# Patient Record
Sex: FEMALE | Race: WHITE | NOT HISPANIC OR LATINO | URBAN - METROPOLITAN AREA
[De-identification: names, ages, dates, MRNs, and addresses within clinical notes are randomized per-mention and may not be internally consistent; named-entity substitution may affect disease eponyms.]

---

## 2020-07-25 ENCOUNTER — TELEPHONE ENCOUNTER (OUTPATIENT)
Dept: URBAN - METROPOLITAN AREA CLINIC 13 | Facility: CLINIC | Age: 55
End: 2020-07-25

## 2020-07-25 RX ORDER — POLYETHYLENE GLYCOL 3350, SODIUM CHLORIDE, SODIUM BICARBONATE AND POTASSIUM CHLORIDE WITH LEMON FLAVOR 420; 11.2; 5.72; 1.48 G/4L; G/4L; G/4L; G/4L
TAKE 1/2 GALLON AT 5:00 PM DAY BEFORE PROCEDURE, TAKE SECOND 1/2 OF GALLON 6 HRS PRIOR TO PROCEDURE POWDER, FOR SOLUTION ORAL
Qty: 1 | Refills: 0 | OUTPATIENT
Start: 2018-12-11 | End: 2018-12-18

## 2020-07-26 ENCOUNTER — TELEPHONE ENCOUNTER (OUTPATIENT)
Dept: URBAN - METROPOLITAN AREA CLINIC 13 | Facility: CLINIC | Age: 55
End: 2020-07-26

## 2020-07-26 RX ORDER — TRAZODONE HYDROCHLORIDE 100 MG/1
TABLET, FILM COATED ORAL
Qty: 45 | Refills: 0 | Status: ACTIVE | COMMUNITY
Start: 2018-04-05

## 2020-07-26 RX ORDER — DOXYCYCLINE HYCLATE 100 MG/1
TABLET ORAL
Qty: 20 | Refills: 0 | Status: ACTIVE | COMMUNITY
Start: 2018-05-12

## 2020-07-26 RX ORDER — OXYCODONE AND ACETAMINOPHEN 5; 325 MG/1; MG/1
TAKE 1 OR 2 TABLETS BY MOUTH EVERY 4 HOURS AS NEEDED TABLET ORAL
Qty: 40 | Refills: 0 | Status: ACTIVE | COMMUNITY
Start: 2018-06-02

## 2020-07-26 RX ORDER — VENLAFAXINE HYDROCHLORIDE 75 MG/1
TAKE ONE CAPSULE BY MOUTH EVERY DAY AT SAME TIME AS 150MG CAPSULE, EXTENDED RELEASE ORAL
Qty: 30 | Refills: 0 | Status: ACTIVE | COMMUNITY
Start: 2018-11-12

## 2020-07-26 RX ORDER — HYDROXYZINE HYDROCHLORIDE 25 MG/1
TABLET, FILM COATED ORAL
Qty: 30 | Refills: 0 | Status: ACTIVE | COMMUNITY
Start: 2019-01-29

## 2020-07-26 RX ORDER — OXYCODONE AND ACETAMINOPHEN 7.5; 325 MG/1; MG/1
TABLET ORAL
Qty: 20 | Refills: 0 | Status: ACTIVE | COMMUNITY
Start: 2018-05-17

## 2020-07-26 RX ORDER — SUMATRIPTAN SUCCINATE 50 MG/1
TAKE 1 TABLET AT ONSET OF MIGRAINE HEADACHE.  MAY REPEAT IN 2 HOURS IF NEEDED TABLET, FILM COATED ORAL
Refills: 0 | Status: ACTIVE | COMMUNITY
Start: 2018-09-06

## 2020-07-26 RX ORDER — PROMETHAZINE HYDROCHLORIDE AND DEXTROMETHORPHAN HYDROBROMIDE 6.25; 15 MG/5ML; MG/5ML
TAKE 5 ML (ORAL) EVERY 6 HOURS FOR 4 DAYS SOLUTION ORAL
Qty: 80 | Refills: 0 | Status: ACTIVE | COMMUNITY
Start: 2018-04-05

## 2020-07-26 RX ORDER — ALPRAZOLAM 1 MG/1
TABLET ORAL
Qty: 90 | Refills: 0 | Status: ACTIVE | COMMUNITY
Start: 2018-03-15

## 2020-07-26 RX ORDER — HYDROXYZINE HYDROCHLORIDE 25 MG/1
TAKE 1 TABLET BY MOUTH AS NEEDED 1 HOUR PRIOR TO BEDTIME TABLET, FILM COATED ORAL
Qty: 30 | Refills: 0 | Status: ACTIVE | COMMUNITY
Start: 2019-01-02

## 2020-07-26 RX ORDER — LEVOFLOXACIN 750 MG/1
TAKE 1 TABLET BY MOUTH EVERY DAY FOR 10 DAYS TABLET, FILM COATED ORAL
Qty: 10 | Refills: 0 | Status: ACTIVE | COMMUNITY
Start: 2019-01-02

## 2020-07-26 RX ORDER — FLUOXETINE HYDROCHLORIDE 40 MG/1
CAPSULE ORAL
Qty: 30 | Refills: 0 | Status: ACTIVE | COMMUNITY
Start: 2018-06-28

## 2020-07-26 RX ORDER — HYDROXYZINE HYDROCHLORIDE 50 MG/1
TABLET, FILM COATED ORAL
Qty: 90 | Refills: 0 | Status: ACTIVE | COMMUNITY
Start: 2018-05-03

## 2020-07-26 RX ORDER — BUSPIRONE HYDROCHLORIDE 10 MG/1
TAKE 1 TABLET BY MOUTH TWICE A DAY TABLET ORAL
Qty: 60 | Refills: 0 | Status: ACTIVE | COMMUNITY
Start: 2018-08-01

## 2020-07-26 RX ORDER — FLUOXETINE HYDROCHLORIDE 40 MG/1
CAPSULE ORAL
Qty: 30 | Refills: 0 | Status: ACTIVE | COMMUNITY
Start: 2018-03-25

## 2020-07-26 RX ORDER — PRAZOSIN HYDROCHLORIDE 1 MG/1
CAPSULE ORAL
Qty: 60 | Refills: 0 | Status: ACTIVE | COMMUNITY
Start: 2018-03-10

## 2020-07-26 RX ORDER — INDOMETHACIN 50 MG/1
TAKE 1 CAPSULE BY MOUTH TWICE A DAY WITH FOOD OR MILK CAPSULE ORAL
Qty: 60 | Refills: 0 | Status: ACTIVE | COMMUNITY
Start: 2019-03-29

## 2020-07-26 RX ORDER — PROMETHAZINE HYDROCHLORIDE 25 MG/1
TAKE 1 TABLET EVERY 6 HOURS AS NEEDED FOR NAUSEA TABLET ORAL
Refills: 0 | Status: ACTIVE | COMMUNITY
Start: 2018-10-12

## 2020-07-26 RX ORDER — TIZANIDINE 4 MG/1
TABLET ORAL
Qty: 90 | Refills: 0 | Status: ACTIVE | COMMUNITY
Start: 2018-04-05

## 2020-07-26 RX ORDER — PROMETHAZINE HYDROCHLORIDE AND DEXTROMETHORPHAN HYDROBROMIDE 6.25; 15 MG/5ML; MG/5ML
TAKE 5 ML BY MOUTH 4 TIMES DAILY AS NEEDED FOR COUGH FOR 6 DAYS. MAY C SOLUTION ORAL
Qty: 120 | Refills: 0 | Status: ACTIVE | COMMUNITY
Start: 2018-05-12

## 2020-07-26 RX ORDER — CLONIDINE HYDROCHLORIDE 0.1 MG/1
TAKE 1 TABLET BY MOUTH AT BEDTIME TABLET ORAL
Qty: 30 | Refills: 0 | Status: ACTIVE | COMMUNITY
Start: 2018-08-01

## 2020-07-26 RX ORDER — ERENUMAB-AOOE 70 MG/ML
INJECT MG ONCE INJECTION SUBCUTANEOUS
Refills: 0 | Status: ACTIVE | COMMUNITY
Start: 2018-10-12

## 2020-07-26 RX ORDER — SULFAMETHOXAZOLE AND TRIMETHOPRIM 800; 160 MG/1; MG/1
TABLET ORAL
Qty: 20 | Refills: 0 | Status: ACTIVE | COMMUNITY
Start: 2018-04-05

## 2020-07-26 RX ORDER — PROMETHAZINE HYDROCHLORIDE 12.5 MG/1
TAKE 1 TABLET BY MOUTH 3 TIMES A DAY AS NEEDED TABLET ORAL
Qty: 90 | Refills: 0 | Status: ACTIVE | COMMUNITY
Start: 2018-11-12

## 2020-07-26 RX ORDER — METHYLPREDNISOLONE 4 MG/1
TAKE 6 TABLETS ON DAY 1 AS DIRECTED ON PACKAGE AND DECREASE BY 1 TAB E TABLET ORAL
Qty: 21 | Refills: 0 | Status: ACTIVE | COMMUNITY
Start: 2019-03-22

## 2020-07-26 RX ORDER — PROMETHAZINE HYDROCHLORIDE AND DEXTROMETHORPHAN HYDROBROMIDE 6.25; 15 MG/5ML; MG/5ML
TAKE 5MLS BY MOUTH EVERY 6 HOURS AS NEEDED FOR COUGHING FOR 6 DAYS SOLUTION ORAL
Qty: 120 | Refills: 0 | Status: ACTIVE | COMMUNITY
Start: 2019-04-30

## 2020-07-26 RX ORDER — SULFAMETHOXAZOLE AND TRIMETHOPRIM 800; 160 MG/1; MG/1
TABLET ORAL
Qty: 28 | Refills: 0 | Status: ACTIVE | COMMUNITY
Start: 2018-11-12

## 2020-07-26 RX ORDER — ZOLMITRIPTAN 5 MG/1
TAKE 1 TABLET AT ONSET OF MIGRAINE. MAY REPEAT ONCE AFTER 2 HOURS. MAX 10 MG/DAY TABLET, FILM COATED ORAL
Refills: 0 | Status: ACTIVE | COMMUNITY
Start: 2018-08-08

## 2020-07-26 RX ORDER — AZELASTINE HCL 205.5 UG/1
INSTILL 1 SPRAY IN EACH NOSTRIL TWICE DAILY AS NEEDED FOR CONGESTION SPRAY NASAL
Qty: 30 | Refills: 0 | Status: ACTIVE | COMMUNITY
Start: 2018-04-05

## 2020-07-26 RX ORDER — METHYLPREDNISOLONE 4 MG/1
TAKE 6 TABLETS ON DAY 1 AS DIRECTED ON PACKAGE AND DECREASE BY 1 TAB E TABLET ORAL
Qty: 21 | Refills: 0 | Status: ACTIVE | COMMUNITY
Start: 2018-04-05

## 2020-07-26 RX ORDER — PROPRANOLOL HYDROCHLORIDE 80 MG/1
TAKE 1 CAPSULE DAILY CAPSULE, EXTENDED RELEASE ORAL
Refills: 0 | Status: ACTIVE | COMMUNITY
Start: 2018-08-15

## 2020-07-26 RX ORDER — TIZANIDINE 4 MG/1
TAKE 1 TABLET BY MOUTH 3 TIMES A DAY AS NEEDED TABLET ORAL
Qty: 90 | Refills: 0 | Status: ACTIVE | COMMUNITY
Start: 2017-10-31

## 2020-07-26 RX ORDER — ESTRADIOL 0.1 MG/D
APPLY 1 PATCH TWICE WEEKLY AS DIRECTED FILM, EXTENDED RELEASE TRANSDERMAL
Refills: 0 | Status: ACTIVE | COMMUNITY
Start: 2018-08-20

## 2020-07-26 RX ORDER — TRAZODONE HYDROCHLORIDE 150 MG/1
TAKE 1 TABLET ONCE DAILY TABLET ORAL
Refills: 0 | Status: ACTIVE | COMMUNITY
Start: 2018-08-28

## 2020-07-26 RX ORDER — ALBUTEROL SULFATE 90 UG/1
INHALE 1 TO 2 PUFFS EVERY 4 TO 6 HOURS AS NEEDED AEROSOL, METERED RESPIRATORY (INHALATION)
Refills: 0 | Status: ACTIVE | COMMUNITY
Start: 2019-01-02

## 2020-07-26 RX ORDER — TRAZODONE HYDROCHLORIDE 100 MG/1
TAKE 1 TO 1 AND 1/2 TABLET BY MOUTH AT BEDTIME AS NEEDED FOR SLEEP TABLET, FILM COATED ORAL
Qty: 45 | Refills: 0 | Status: ACTIVE | COMMUNITY
Start: 2018-02-19

## 2020-07-26 RX ORDER — ALBUTEROL SULFATE 90 UG/1
INHALE 1-2 PUFFS INTO THE LUNGS 4 TIMES DAILY AS NEEDED AEROSOL, METERED RESPIRATORY (INHALATION)
Qty: 9 | Refills: 0 | Status: ACTIVE | COMMUNITY
Start: 2018-05-12

## 2020-07-26 RX ORDER — TOPIRAMATE 100 MG/1
TABLET, FILM COATED ORAL
Qty: 180 | Refills: 0 | Status: ACTIVE | COMMUNITY
Start: 2018-09-13

## 2020-07-26 RX ORDER — GALCANEZUMAB 120 MG/ML
INJECT 2 PENS SUBCUTANEOUSLY AS DIRECTED FOR LOADING DOSE INJECTION, SOLUTION SUBCUTANEOUS
Qty: 2 | Refills: 0 | Status: ACTIVE | COMMUNITY
Start: 2019-02-21

## 2020-07-26 RX ORDER — HYDROCODONE BITARTRATE AND ACETAMINOPHEN 5; 325 MG/1; MG/1
TAKE 1 TABLET BY MOUTH EVERY 6 HOURS TABLET ORAL
Qty: 40 | Refills: 0 | Status: ACTIVE | COMMUNITY
Start: 2018-06-14

## 2020-07-26 RX ORDER — FLUOXETINE HYDROCHLORIDE 40 MG/1
TAKE ONE CAPSULE BY MOUTH EVERY DAY CAPSULE ORAL
Qty: 30 | Refills: 0 | Status: ACTIVE | COMMUNITY
Start: 2018-05-01

## 2020-07-26 RX ORDER — LIOTHYRONINE SODIUM 5 UG/1
TAKE 1 TABLET DAILY TABLET ORAL
Refills: 0 | Status: ACTIVE | COMMUNITY
Start: 2018-12-04

## 2020-07-26 RX ORDER — LEVOTHYROXINE SODIUM 0.07 MG/1
TAKE 1 TABLET DAILY TABLET ORAL
Refills: 0 | Status: ACTIVE | COMMUNITY
Start: 2018-08-15

## 2020-07-26 RX ORDER — ESCITALOPRAM 20 MG/1
TAKE 1 TABLET BY MOUTH EVERY MORNING TABLET, FILM COATED ORAL
Qty: 30 | Refills: 0 | Status: ACTIVE | COMMUNITY
Start: 2018-10-22

## 2020-07-26 RX ORDER — HYDROXYZINE HYDROCHLORIDE 50 MG/1
TAKE 1 TABLET BY MOUTH 3 TIMES A DAY AS NEEDED FOR ANXIETY TABLET, FILM COATED ORAL
Qty: 90 | Refills: 0 | Status: ACTIVE | COMMUNITY
Start: 2018-05-01

## 2020-07-26 RX ORDER — DOXYCYCLINE HYCLATE 100 MG/1
TAKE 1 TABLET BY MOUTH TWICE A DAY FOR 10 DAYS TABLET ORAL
Qty: 20 | Refills: 0 | Status: ACTIVE | COMMUNITY
Start: 2019-04-30

## 2020-07-26 RX ORDER — FLUOXETINE 20 MG/1
TAKE 3 CAPSULES BY MOUTH EVERY DAY CAPSULE ORAL
Qty: 90 | Refills: 0 | Status: ACTIVE | COMMUNITY
Start: 2018-08-01

## 2020-07-26 RX ORDER — SULFAMETHOXAZOLE AND TRIMETHOPRIM 800; 160 MG/1; MG/1
TAKE 1 TABLET BY MOUTH TWICE A DAY TABLET ORAL
Qty: 28 | Refills: 0 | Status: ACTIVE | COMMUNITY
Start: 2018-10-22

## 2020-07-26 RX ORDER — PRAZOSIN HYDROCHLORIDE 1 MG/1
TAKE 1 TO 2 CAPSULES BY MOUTH AT BEDTIME CAPSULE ORAL
Qty: 60 | Refills: 0 | Status: ACTIVE | COMMUNITY
Start: 2017-11-27

## 2020-07-26 RX ORDER — FLUOXETINE HYDROCHLORIDE 40 MG/1
TAKE ONE CAPSULE BY MOUTH EVERY DAY CAPSULE ORAL
Qty: 30 | Refills: 0 | Status: ACTIVE | COMMUNITY
Start: 2018-02-19

## 2020-07-26 RX ORDER — VENLAFAXINE HYDROCHLORIDE 150 MG/1
CAPSULE, EXTENDED RELEASE ORAL
Qty: 30 | Refills: 0 | Status: ACTIVE | COMMUNITY
Start: 2018-11-12

## 2020-07-26 RX ORDER — ATORVASTATIN CALCIUM 80 MG/1
TAKE 1 TABLET DAILY TABLET, FILM COATED ORAL
Refills: 0 | Status: ACTIVE | COMMUNITY
Start: 2018-10-21

## 2020-07-26 RX ORDER — ALPRAZOLAM 1 MG/1
TAKE 1 TABLET  PRN TABLET ORAL
Refills: 0 | Status: ACTIVE | COMMUNITY
Start: 2018-02-19

## 2020-07-26 RX ORDER — CODEINE PHOSPHATE AND GUAIFENESIN 10; 100 MG/5ML; MG/5ML
TAKE 5 ML BY MOUTH EVERY 4 HOURS FOR 10 DAYS SOLUTION ORAL
Qty: 300 | Refills: 0 | Status: ACTIVE | COMMUNITY
Start: 2018-11-12

## 2023-03-21 ENCOUNTER — WEB ENCOUNTER (OUTPATIENT)
Dept: URBAN - METROPOLITAN AREA CLINIC 113 | Facility: CLINIC | Age: 58
End: 2023-03-21

## 2023-03-24 ENCOUNTER — OFFICE VISIT (OUTPATIENT)
Dept: URBAN - METROPOLITAN AREA CLINIC 113 | Facility: CLINIC | Age: 58
End: 2023-03-24
Payer: COMMERCIAL

## 2023-03-24 ENCOUNTER — LAB OUTSIDE AN ENCOUNTER (OUTPATIENT)
Dept: URBAN - METROPOLITAN AREA CLINIC 113 | Facility: CLINIC | Age: 58
End: 2023-03-24

## 2023-03-24 DIAGNOSIS — E61.1 IRON DEFICIENCY: ICD-10-CM

## 2023-03-24 DIAGNOSIS — R19.4 CHANGE IN BOWEL HABITS: ICD-10-CM

## 2023-03-24 PROCEDURE — 99204 OFFICE O/P NEW MOD 45 MIN: CPT | Performed by: INTERNAL MEDICINE

## 2023-03-24 RX ORDER — BUSPIRONE HYDROCHLORIDE 10 MG/1
TAKE 1 TABLET BY MOUTH TWICE A DAY TABLET ORAL
Qty: 60 | Refills: 0 | Status: ON HOLD | COMMUNITY
Start: 2018-08-01

## 2023-03-24 RX ORDER — SULFAMETHOXAZOLE AND TRIMETHOPRIM 800; 160 MG/1; MG/1
TABLET ORAL
Qty: 20 | Refills: 0 | Status: ON HOLD | COMMUNITY
Start: 2018-04-05

## 2023-03-24 RX ORDER — LEVOTHYROXINE SODIUM 0.07 MG/1
TAKE 1 TABLET DAILY TABLET ORAL
Refills: 0 | Status: ON HOLD | COMMUNITY
Start: 2018-08-15

## 2023-03-24 RX ORDER — VENLAFAXINE HYDROCHLORIDE 150 MG/1
CAPSULE, EXTENDED RELEASE ORAL
Qty: 30 | Refills: 0 | Status: ON HOLD | COMMUNITY
Start: 2018-11-12

## 2023-03-24 RX ORDER — CODEINE PHOSPHATE AND GUAIFENESIN 10; 100 MG/5ML; MG/5ML
TAKE 5 ML BY MOUTH EVERY 4 HOURS FOR 10 DAYS SOLUTION ORAL
Qty: 300 | Refills: 0 | Status: ON HOLD | COMMUNITY
Start: 2018-11-12

## 2023-03-24 RX ORDER — PROMETHAZINE HYDROCHLORIDE AND DEXTROMETHORPHAN HYDROBROMIDE 6.25; 15 MG/5ML; MG/5ML
TAKE 5 ML (ORAL) EVERY 6 HOURS FOR 4 DAYS SOLUTION ORAL
Qty: 80 | Refills: 0 | Status: ON HOLD | COMMUNITY
Start: 2018-04-05

## 2023-03-24 RX ORDER — UBROGEPANT 100 MG/1
1 TABLET MAY TAKE SECOND DOSE AT LEAST 2 HOURS AFTER FIRST DOSE AS NEEDED TABLET ORAL ONCE A DAY
Status: ACTIVE | COMMUNITY

## 2023-03-24 RX ORDER — ZOLMITRIPTAN 5 MG/1
TAKE 1 TABLET AT ONSET OF MIGRAINE. MAY REPEAT ONCE AFTER 2 HOURS. MAX 10 MG/DAY TABLET, FILM COATED ORAL
Refills: 0 | Status: ON HOLD | COMMUNITY
Start: 2018-08-08

## 2023-03-24 RX ORDER — GALCANEZUMAB 120 MG/ML
INJECT 2 PENS SUBCUTANEOUSLY AS DIRECTED FOR LOADING DOSE INJECTION, SOLUTION SUBCUTANEOUS
Qty: 2 | Refills: 0 | Status: ON HOLD | COMMUNITY
Start: 2019-02-21

## 2023-03-24 RX ORDER — HYDROXYZINE HYDROCHLORIDE 50 MG/1
TAKE 1 TABLET BY MOUTH 3 TIMES A DAY AS NEEDED FOR ANXIETY TABLET, FILM COATED ORAL
Qty: 90 | Refills: 0 | Status: ON HOLD | COMMUNITY
Start: 2018-05-01

## 2023-03-24 RX ORDER — DOXYCYCLINE HYCLATE 100 MG/1
TABLET ORAL
Qty: 20 | Refills: 0 | Status: ON HOLD | COMMUNITY
Start: 2018-05-12

## 2023-03-24 RX ORDER — METHYLPREDNISOLONE 4 MG/1
TAKE 6 TABLETS ON DAY 1 AS DIRECTED ON PACKAGE AND DECREASE BY 1 TAB E TABLET ORAL
Qty: 21 | Refills: 0 | Status: ON HOLD | COMMUNITY
Start: 2018-04-05

## 2023-03-24 RX ORDER — ESTRADIOL 0.1 MG/D
APPLY 1 PATCH TWICE WEEKLY AS DIRECTED FILM, EXTENDED RELEASE TRANSDERMAL
Refills: 0 | Status: ACTIVE | COMMUNITY
Start: 2018-08-20

## 2023-03-24 RX ORDER — SUMATRIPTAN SUCCINATE 50 MG/1
TAKE 1 TABLET AT ONSET OF MIGRAINE HEADACHE.  MAY REPEAT IN 2 HOURS IF NEEDED TABLET, FILM COATED ORAL
Refills: 0 | Status: ON HOLD | COMMUNITY
Start: 2018-09-06

## 2023-03-24 RX ORDER — OXYCODONE AND ACETAMINOPHEN 7.5; 325 MG/1; MG/1
TABLET ORAL
Qty: 20 | Refills: 0 | Status: ON HOLD | COMMUNITY
Start: 2018-05-17

## 2023-03-24 RX ORDER — VENLAFAXINE HYDROCHLORIDE 75 MG/1
TAKE ONE CAPSULE BY MOUTH EVERY DAY AT SAME TIME AS 150MG CAPSULE, EXTENDED RELEASE ORAL
Qty: 30 | Refills: 0 | Status: ON HOLD | COMMUNITY
Start: 2018-11-12

## 2023-03-24 RX ORDER — TIZANIDINE 4 MG/1
TAKE 1 TABLET BY MOUTH 3 TIMES A DAY AS NEEDED TABLET ORAL
Qty: 90 | Refills: 0 | Status: ON HOLD | COMMUNITY
Start: 2017-10-31

## 2023-03-24 RX ORDER — ALPRAZOLAM 1 MG/1
TAKE 1 TABLET  PRN TABLET ORAL
Refills: 0 | Status: ON HOLD | COMMUNITY
Start: 2018-02-19

## 2023-03-24 RX ORDER — METFORMIN HYDROCHLORIDE 850 MG/1
1 TABLET WITH A MEAL TABLET, FILM COATED ORAL ONCE A DAY
Status: ACTIVE | COMMUNITY

## 2023-03-24 RX ORDER — INDOMETHACIN 50 MG/1
TAKE 1 CAPSULE BY MOUTH TWICE A DAY WITH FOOD OR MILK CAPSULE ORAL
Qty: 60 | Refills: 0 | Status: ON HOLD | COMMUNITY
Start: 2019-03-29

## 2023-03-24 RX ORDER — LEVOFLOXACIN 750 MG/1
TAKE 1 TABLET BY MOUTH EVERY DAY FOR 10 DAYS TABLET, FILM COATED ORAL
Qty: 10 | Refills: 0 | Status: ON HOLD | COMMUNITY
Start: 2019-01-02

## 2023-03-24 RX ORDER — ATORVASTATIN CALCIUM 80 MG/1
1 TABLET TABLET, FILM COATED ORAL ONCE A DAY
Status: ACTIVE | COMMUNITY

## 2023-03-24 RX ORDER — ERENUMAB-AOOE 70 MG/ML
INJECT MG ONCE INJECTION SUBCUTANEOUS
Refills: 0 | Status: ON HOLD | COMMUNITY
Start: 2018-10-12

## 2023-03-24 RX ORDER — ESCITALOPRAM 20 MG/1
TAKE 1 TABLET BY MOUTH EVERY MORNING TABLET, FILM COATED ORAL
Qty: 30 | Refills: 0 | Status: ON HOLD | COMMUNITY
Start: 2018-10-22

## 2023-03-24 RX ORDER — TRAZODONE HYDROCHLORIDE 150 MG/1
TAKE 1 TABLET ONCE DAILY TABLET ORAL
Refills: 0 | Status: ON HOLD | COMMUNITY
Start: 2018-08-28

## 2023-03-24 RX ORDER — PRAZOSIN HYDROCHLORIDE 1 MG/1
TAKE 1 TO 2 CAPSULES BY MOUTH AT BEDTIME CAPSULE ORAL
Qty: 60 | Refills: 0 | Status: ON HOLD | COMMUNITY
Start: 2017-11-27

## 2023-03-24 RX ORDER — PROMETHAZINE HYDROCHLORIDE 12.5 MG/1
TAKE 1 TABLET BY MOUTH 3 TIMES A DAY AS NEEDED TABLET ORAL
Qty: 90 | Refills: 0 | Status: ON HOLD | COMMUNITY
Start: 2018-11-12

## 2023-03-24 RX ORDER — LIOTHYRONINE SODIUM 5 UG/1
TAKE 1 TABLET DAILY TABLET ORAL
Refills: 0 | Status: ON HOLD | COMMUNITY
Start: 2018-12-04

## 2023-03-24 RX ORDER — PROPRANOLOL HYDROCHLORIDE 80 MG/1
TAKE 1 CAPSULE DAILY CAPSULE, EXTENDED RELEASE ORAL
Refills: 0 | Status: ACTIVE | COMMUNITY
Start: 2018-08-15

## 2023-03-24 RX ORDER — OXYCODONE AND ACETAMINOPHEN 5; 325 MG/1; MG/1
TAKE 1 OR 2 TABLETS BY MOUTH EVERY 4 HOURS AS NEEDED TABLET ORAL
Qty: 40 | Refills: 0 | Status: ON HOLD | COMMUNITY
Start: 2018-06-02

## 2023-03-24 RX ORDER — TOPIRAMATE 100 MG/1
TABLET, FILM COATED ORAL
Qty: 180 | Refills: 0 | Status: ON HOLD | COMMUNITY
Start: 2018-09-13

## 2023-03-24 RX ORDER — FLUOXETINE 20 MG/1
TAKE 3 CAPSULES BY MOUTH EVERY DAY CAPSULE ORAL
Qty: 90 | Refills: 0 | Status: ON HOLD | COMMUNITY
Start: 2018-08-01

## 2023-03-24 RX ORDER — AZELASTINE HCL 205.5 UG/1
INSTILL 1 SPRAY IN EACH NOSTRIL TWICE DAILY AS NEEDED FOR CONGESTION SPRAY NASAL
Qty: 30 | Refills: 0 | Status: ON HOLD | COMMUNITY
Start: 2018-04-05

## 2023-03-24 RX ORDER — HYDROCODONE BITARTRATE AND ACETAMINOPHEN 5; 325 MG/1; MG/1
TAKE 1 TABLET BY MOUTH EVERY 6 HOURS TABLET ORAL
Qty: 40 | Refills: 0 | Status: ON HOLD | COMMUNITY
Start: 2018-06-14

## 2023-03-24 RX ORDER — FLUOXETINE HYDROCHLORIDE 40 MG/1
TAKE ONE CAPSULE BY MOUTH EVERY DAY CAPSULE ORAL
Qty: 30 | Refills: 0 | Status: ON HOLD | COMMUNITY
Start: 2018-02-19

## 2023-03-24 RX ORDER — CLONIDINE HYDROCHLORIDE 0.1 MG/1
TAKE 1 TABLET BY MOUTH AT BEDTIME TABLET ORAL
Qty: 30 | Refills: 0 | Status: ON HOLD | COMMUNITY
Start: 2018-08-01

## 2023-03-24 RX ORDER — ALBUTEROL SULFATE 90 UG/1
INHALE 1 TO 2 PUFFS EVERY 4 TO 6 HOURS AS NEEDED AEROSOL, METERED RESPIRATORY (INHALATION)
Refills: 0 | Status: ON HOLD | COMMUNITY
Start: 2019-01-02

## 2023-03-24 RX ORDER — TRAZODONE HYDROCHLORIDE 300 MG/1
1 TABLET AT BEDTIME AS NEEDED TABLET ORAL ONCE A DAY
Refills: 0 | Status: ACTIVE | COMMUNITY
Start: 2018-02-19

## 2023-03-24 RX ORDER — ALBUTEROL SULFATE 90 UG/1
INHALE 1-2 PUFFS INTO THE LUNGS 4 TIMES DAILY AS NEEDED AEROSOL, METERED RESPIRATORY (INHALATION)
Qty: 9 | Refills: 0 | Status: ON HOLD | COMMUNITY
Start: 2018-05-12

## 2023-03-24 RX ORDER — HYDROXYZINE HYDROCHLORIDE 25 MG/1
TAKE 1 TABLET BY MOUTH AS NEEDED 1 HOUR PRIOR TO BEDTIME TABLET, FILM COATED ORAL
Qty: 30 | Refills: 0 | Status: ON HOLD | COMMUNITY
Start: 2019-01-02

## 2023-03-24 RX ORDER — PROMETHAZINE HYDROCHLORIDE 25 MG/1
TAKE 1 TABLET EVERY 6 HOURS AS NEEDED FOR NAUSEA TABLET ORAL
Refills: 0 | Status: ON HOLD | COMMUNITY
Start: 2018-10-12

## 2023-03-24 NOTE — HPI-TODAY'S VISIT:
Leila Kwon is a 57-year-old female referred by Dr. Shaikh for iron deficiency anemia.  She was previously followed in our practice by Dr. Darren Patel.  The underwent upper and lower endoscopy by Dr. Patel in 2018/19.  EGD 01/10/2019 showed normal upper GI tract findings, with small bowel biopsies negative for sprue.  Colonoscopy done on December 18, 2018 showed  a good bowel preparation, [place] bowel prep score of 8,.  Skin tag, a few nonbleeding colonic angiectasias, redundant colon, and internal hemorrhoids.  Consideration was given to small bowel follow-through versus capsule enteroscopy at that point.  However, ultimately, capsule enteroscopy was not undertaken.  The patient recently was found to have iron deficiency anemia once again.  Labs sent by Dr. Shaikh show a hemoglobin of 12.3, hematocrit of 37.7, MCV is 87.7 and a platelet count of 232,000.  Her vitamin B12 level was normal at 361, folic acid level low normal at 11.4, and iron studies showed a ferritin of 6, with an iron saturation of 20%.  The patient denies any clinically overt GI bleeding.  She has had no melena or hematemesis.  She denies any abdominal pain.  She has noted some difficulty with defecation ever since her rectocele surgery in 2021.  She has noticed increased gas since then as well.  She has been losing weight recently, but this has been on purpose.  The patient was referred back to this practice because of iron deficiency.

## 2023-06-14 ENCOUNTER — TELEPHONE ENCOUNTER (OUTPATIENT)
Dept: URBAN - METROPOLITAN AREA CLINIC 113 | Facility: CLINIC | Age: 58
End: 2023-06-14

## 2023-06-14 ENCOUNTER — OUT OF OFFICE VISIT (OUTPATIENT)
Dept: URBAN - METROPOLITAN AREA SURGERY CENTER 25 | Facility: SURGERY CENTER | Age: 58
End: 2023-06-14

## 2023-06-14 ENCOUNTER — OFFICE VISIT (OUTPATIENT)
Dept: URBAN - METROPOLITAN AREA SURGERY CENTER 25 | Facility: SURGERY CENTER | Age: 58
End: 2023-06-14
Payer: COMMERCIAL

## 2023-06-14 ENCOUNTER — LAB OUTSIDE AN ENCOUNTER (OUTPATIENT)
Dept: URBAN - METROPOLITAN AREA CLINIC 113 | Facility: CLINIC | Age: 58
End: 2023-06-14

## 2023-06-14 DIAGNOSIS — D50.8 OTHER IRON DEFICIENCY ANEMIA: ICD-10-CM

## 2023-06-14 DIAGNOSIS — K57.30 DIVERTICULA OF COLON: ICD-10-CM

## 2023-06-14 DIAGNOSIS — D50.9 ANEMIA, IRON DEFICIENCY: ICD-10-CM

## 2023-06-14 DIAGNOSIS — K64.0 GRADE I INTERNAL HEMORRHOIDS: ICD-10-CM

## 2023-06-14 PROCEDURE — 00811 ANES LWR INTST NDSC NOS: CPT | Performed by: ANESTHESIOLOGIST ASSISTANT

## 2023-06-14 PROCEDURE — 00811 ANES LWR INTST NDSC NOS: CPT | Performed by: ANESTHESIOLOGY

## 2023-06-14 PROCEDURE — G8907 PT DOC NO EVENTS ON DISCHARG: HCPCS | Performed by: INTERNAL MEDICINE

## 2023-06-14 PROCEDURE — 45378 DIAGNOSTIC COLONOSCOPY: CPT | Performed by: INTERNAL MEDICINE

## 2023-06-14 RX ORDER — DOXYCYCLINE HYCLATE 100 MG/1
TABLET ORAL
Qty: 20 | Refills: 0 | Status: ON HOLD | COMMUNITY
Start: 2018-05-12

## 2023-06-14 RX ORDER — VENLAFAXINE HYDROCHLORIDE 150 MG/1
CAPSULE, EXTENDED RELEASE ORAL
Qty: 30 | Refills: 0 | Status: ON HOLD | COMMUNITY
Start: 2018-11-12

## 2023-06-14 RX ORDER — VENLAFAXINE HYDROCHLORIDE 75 MG/1
TAKE ONE CAPSULE BY MOUTH EVERY DAY AT SAME TIME AS 150MG CAPSULE, EXTENDED RELEASE ORAL
Qty: 30 | Refills: 0 | Status: ON HOLD | COMMUNITY
Start: 2018-11-12

## 2023-06-14 RX ORDER — PRAZOSIN HYDROCHLORIDE 1 MG/1
TAKE 1 TO 2 CAPSULES BY MOUTH AT BEDTIME CAPSULE ORAL
Qty: 60 | Refills: 0 | Status: ON HOLD | COMMUNITY
Start: 2017-11-27

## 2023-06-14 RX ORDER — ATORVASTATIN CALCIUM 80 MG/1
1 TABLET TABLET, FILM COATED ORAL ONCE A DAY
Status: ACTIVE | COMMUNITY

## 2023-06-14 RX ORDER — ZOLMITRIPTAN 5 MG/1
TAKE 1 TABLET AT ONSET OF MIGRAINE. MAY REPEAT ONCE AFTER 2 HOURS. MAX 10 MG/DAY TABLET, FILM COATED ORAL
Refills: 0 | Status: ON HOLD | COMMUNITY
Start: 2018-08-08

## 2023-06-14 RX ORDER — ALPRAZOLAM 1 MG/1
TAKE 1 TABLET  PRN TABLET ORAL
Refills: 0 | Status: ON HOLD | COMMUNITY
Start: 2018-02-19

## 2023-06-14 RX ORDER — PROMETHAZINE HYDROCHLORIDE 12.5 MG/1
TAKE 1 TABLET BY MOUTH 3 TIMES A DAY AS NEEDED TABLET ORAL
Qty: 90 | Refills: 0 | Status: ON HOLD | COMMUNITY
Start: 2018-11-12

## 2023-06-14 RX ORDER — INDOMETHACIN 50 MG/1
TAKE 1 CAPSULE BY MOUTH TWICE A DAY WITH FOOD OR MILK CAPSULE ORAL
Qty: 60 | Refills: 0 | Status: ON HOLD | COMMUNITY
Start: 2019-03-29

## 2023-06-14 RX ORDER — HYDROCODONE BITARTRATE AND ACETAMINOPHEN 5; 325 MG/1; MG/1
TAKE 1 TABLET BY MOUTH EVERY 6 HOURS TABLET ORAL
Qty: 40 | Refills: 0 | Status: ON HOLD | COMMUNITY
Start: 2018-06-14

## 2023-06-14 RX ORDER — METHYLPREDNISOLONE 4 MG/1
TAKE 6 TABLETS ON DAY 1 AS DIRECTED ON PACKAGE AND DECREASE BY 1 TAB E TABLET ORAL
Qty: 21 | Refills: 0 | Status: ON HOLD | COMMUNITY
Start: 2018-04-05

## 2023-06-14 RX ORDER — ERENUMAB-AOOE 70 MG/ML
INJECT MG ONCE INJECTION SUBCUTANEOUS
Refills: 0 | Status: ON HOLD | COMMUNITY
Start: 2018-10-12

## 2023-06-14 RX ORDER — SULFAMETHOXAZOLE AND TRIMETHOPRIM 800; 160 MG/1; MG/1
TABLET ORAL
Qty: 20 | Refills: 0 | Status: ON HOLD | COMMUNITY
Start: 2018-04-05

## 2023-06-14 RX ORDER — SUMATRIPTAN SUCCINATE 50 MG/1
TAKE 1 TABLET AT ONSET OF MIGRAINE HEADACHE.  MAY REPEAT IN 2 HOURS IF NEEDED TABLET, FILM COATED ORAL
Refills: 0 | Status: ON HOLD | COMMUNITY
Start: 2018-09-06

## 2023-06-14 RX ORDER — CODEINE PHOSPHATE AND GUAIFENESIN 10; 100 MG/5ML; MG/5ML
TAKE 5 ML BY MOUTH EVERY 4 HOURS FOR 10 DAYS SOLUTION ORAL
Qty: 300 | Refills: 0 | Status: ON HOLD | COMMUNITY
Start: 2018-11-12

## 2023-06-14 RX ORDER — ALBUTEROL SULFATE 90 UG/1
INHALE 1 TO 2 PUFFS EVERY 4 TO 6 HOURS AS NEEDED AEROSOL, METERED RESPIRATORY (INHALATION)
Refills: 0 | Status: ON HOLD | COMMUNITY
Start: 2019-01-02

## 2023-06-14 RX ORDER — METFORMIN HYDROCHLORIDE 850 MG/1
1 TABLET WITH A MEAL TABLET, FILM COATED ORAL ONCE A DAY
Status: ACTIVE | COMMUNITY

## 2023-06-14 RX ORDER — ESCITALOPRAM 20 MG/1
TAKE 1 TABLET BY MOUTH EVERY MORNING TABLET, FILM COATED ORAL
Qty: 30 | Refills: 0 | Status: ON HOLD | COMMUNITY
Start: 2018-10-22

## 2023-06-14 RX ORDER — TRAZODONE HYDROCHLORIDE 150 MG/1
TAKE 1 TABLET ONCE DAILY TABLET ORAL
Refills: 0 | Status: ON HOLD | COMMUNITY
Start: 2018-08-28

## 2023-06-14 RX ORDER — PROMETHAZINE HYDROCHLORIDE 25 MG/1
TAKE 1 TABLET EVERY 6 HOURS AS NEEDED FOR NAUSEA TABLET ORAL
Refills: 0 | Status: ON HOLD | COMMUNITY
Start: 2018-10-12

## 2023-06-14 RX ORDER — OXYCODONE AND ACETAMINOPHEN 5; 325 MG/1; MG/1
TAKE 1 OR 2 TABLETS BY MOUTH EVERY 4 HOURS AS NEEDED TABLET ORAL
Qty: 40 | Refills: 0 | Status: ON HOLD | COMMUNITY
Start: 2018-06-02

## 2023-06-14 RX ORDER — LIOTHYRONINE SODIUM 5 UG/1
TAKE 1 TABLET DAILY TABLET ORAL
Refills: 0 | Status: ON HOLD | COMMUNITY
Start: 2018-12-04

## 2023-06-14 RX ORDER — ESTRADIOL 0.1 MG/D
APPLY 1 PATCH TWICE WEEKLY AS DIRECTED FILM, EXTENDED RELEASE TRANSDERMAL
Refills: 0 | Status: ACTIVE | COMMUNITY
Start: 2018-08-20

## 2023-06-14 RX ORDER — ALBUTEROL SULFATE 90 UG/1
INHALE 1-2 PUFFS INTO THE LUNGS 4 TIMES DAILY AS NEEDED AEROSOL, METERED RESPIRATORY (INHALATION)
Qty: 9 | Refills: 0 | Status: ON HOLD | COMMUNITY
Start: 2018-05-12

## 2023-06-14 RX ORDER — OXYCODONE AND ACETAMINOPHEN 7.5; 325 MG/1; MG/1
TABLET ORAL
Qty: 20 | Refills: 0 | Status: ON HOLD | COMMUNITY
Start: 2018-05-17

## 2023-06-14 RX ORDER — PROPRANOLOL HYDROCHLORIDE 80 MG/1
TAKE 1 CAPSULE DAILY CAPSULE, EXTENDED RELEASE ORAL
Refills: 0 | Status: ACTIVE | COMMUNITY
Start: 2018-08-15

## 2023-06-14 RX ORDER — TRAZODONE HYDROCHLORIDE 300 MG/1
1 TABLET AT BEDTIME AS NEEDED TABLET ORAL ONCE A DAY
Refills: 0 | Status: ACTIVE | COMMUNITY
Start: 2018-02-19

## 2023-06-14 RX ORDER — BUSPIRONE HYDROCHLORIDE 10 MG/1
TAKE 1 TABLET BY MOUTH TWICE A DAY TABLET ORAL
Qty: 60 | Refills: 0 | Status: ON HOLD | COMMUNITY
Start: 2018-08-01

## 2023-06-14 RX ORDER — AZELASTINE HCL 205.5 UG/1
INSTILL 1 SPRAY IN EACH NOSTRIL TWICE DAILY AS NEEDED FOR CONGESTION SPRAY NASAL
Qty: 30 | Refills: 0 | Status: ON HOLD | COMMUNITY
Start: 2018-04-05

## 2023-06-14 RX ORDER — HYDROXYZINE HYDROCHLORIDE 50 MG/1
TAKE 1 TABLET BY MOUTH 3 TIMES A DAY AS NEEDED FOR ANXIETY TABLET, FILM COATED ORAL
Qty: 90 | Refills: 0 | Status: ON HOLD | COMMUNITY
Start: 2018-05-01

## 2023-06-14 RX ORDER — LEVOFLOXACIN 750 MG/1
TAKE 1 TABLET BY MOUTH EVERY DAY FOR 10 DAYS TABLET, FILM COATED ORAL
Qty: 10 | Refills: 0 | Status: ON HOLD | COMMUNITY
Start: 2019-01-02

## 2023-06-14 RX ORDER — GALCANEZUMAB 120 MG/ML
INJECT 2 PENS SUBCUTANEOUSLY AS DIRECTED FOR LOADING DOSE INJECTION, SOLUTION SUBCUTANEOUS
Qty: 2 | Refills: 0 | Status: ON HOLD | COMMUNITY
Start: 2019-02-21

## 2023-06-14 RX ORDER — HYDROXYZINE HYDROCHLORIDE 25 MG/1
TAKE 1 TABLET BY MOUTH AS NEEDED 1 HOUR PRIOR TO BEDTIME TABLET, FILM COATED ORAL
Qty: 30 | Refills: 0 | Status: ON HOLD | COMMUNITY
Start: 2019-01-02

## 2023-06-14 RX ORDER — FLUOXETINE 20 MG/1
TAKE 3 CAPSULES BY MOUTH EVERY DAY CAPSULE ORAL
Qty: 90 | Refills: 0 | Status: ON HOLD | COMMUNITY
Start: 2018-08-01

## 2023-06-14 RX ORDER — PROMETHAZINE HYDROCHLORIDE AND DEXTROMETHORPHAN HYDROBROMIDE 6.25; 15 MG/5ML; MG/5ML
TAKE 5 ML (ORAL) EVERY 6 HOURS FOR 4 DAYS SOLUTION ORAL
Qty: 80 | Refills: 0 | Status: ON HOLD | COMMUNITY
Start: 2018-04-05

## 2023-06-14 RX ORDER — TOPIRAMATE 100 MG/1
TABLET, FILM COATED ORAL
Qty: 180 | Refills: 0 | Status: ON HOLD | COMMUNITY
Start: 2018-09-13

## 2023-06-14 RX ORDER — FLUOXETINE HYDROCHLORIDE 40 MG/1
TAKE ONE CAPSULE BY MOUTH EVERY DAY CAPSULE ORAL
Qty: 30 | Refills: 0 | Status: ON HOLD | COMMUNITY
Start: 2018-02-19

## 2023-06-14 RX ORDER — LEVOTHYROXINE SODIUM 0.07 MG/1
TAKE 1 TABLET DAILY TABLET ORAL
Refills: 0 | Status: ON HOLD | COMMUNITY
Start: 2018-08-15

## 2023-06-14 RX ORDER — TIZANIDINE 4 MG/1
TAKE 1 TABLET BY MOUTH 3 TIMES A DAY AS NEEDED TABLET ORAL
Qty: 90 | Refills: 0 | Status: ON HOLD | COMMUNITY
Start: 2017-10-31

## 2023-06-14 RX ORDER — CLONIDINE HYDROCHLORIDE 0.1 MG/1
TAKE 1 TABLET BY MOUTH AT BEDTIME TABLET ORAL
Qty: 30 | Refills: 0 | Status: ON HOLD | COMMUNITY
Start: 2018-08-01

## 2023-06-14 RX ORDER — UBROGEPANT 100 MG/1
1 TABLET MAY TAKE SECOND DOSE AT LEAST 2 HOURS AFTER FIRST DOSE AS NEEDED TABLET ORAL ONCE A DAY
Status: ACTIVE | COMMUNITY

## 2023-07-11 ENCOUNTER — CLAIMS CREATED FROM THE CLAIM WINDOW (OUTPATIENT)
Dept: URBAN - METROPOLITAN AREA CLINIC 4 | Facility: CLINIC | Age: 58
End: 2023-07-11
Payer: COMMERCIAL

## 2023-07-11 ENCOUNTER — OFFICE VISIT (OUTPATIENT)
Dept: URBAN - METROPOLITAN AREA SURGERY CENTER 25 | Facility: SURGERY CENTER | Age: 58
End: 2023-07-11
Payer: COMMERCIAL

## 2023-07-11 DIAGNOSIS — K29.50 CHRONIC GASTRITIS: ICD-10-CM

## 2023-07-11 DIAGNOSIS — K29.40 CHRONIC ATROPHIC GASTRITIS WITHOUT BLEEDING: ICD-10-CM

## 2023-07-11 DIAGNOSIS — K31.89 ACQUIRED DEFORMITY OF DUODENUM: ICD-10-CM

## 2023-07-11 DIAGNOSIS — K29.70 REACTIVE GASTROPATHY: ICD-10-CM

## 2023-07-11 DIAGNOSIS — K29.70 GASTRITIS, UNSPECIFIED, WITHOUT BLEEDING: ICD-10-CM

## 2023-07-11 DIAGNOSIS — D50.8 OTHER IRON DEFICIENCY ANEMIA: ICD-10-CM

## 2023-07-11 DIAGNOSIS — D50.9 ANEMIA: ICD-10-CM

## 2023-07-11 DIAGNOSIS — K31.89 OTHER DISEASES OF STOMACH AND DUODENUM: ICD-10-CM

## 2023-07-11 DIAGNOSIS — K29.70 CHRONIC ACITVE GASTRITIS (H.PYLORI NEGATIVE): ICD-10-CM

## 2023-07-11 PROCEDURE — 00731 ANES UPR GI NDSC PX NOS: CPT | Performed by: NURSE ANESTHETIST, CERTIFIED REGISTERED

## 2023-07-11 PROCEDURE — 00731 ANES UPR GI NDSC PX NOS: CPT | Performed by: ANESTHESIOLOGY

## 2023-07-11 PROCEDURE — G8907 PT DOC NO EVENTS ON DISCHARG: HCPCS | Performed by: INTERNAL MEDICINE

## 2023-07-11 PROCEDURE — 88341 IMHCHEM/IMCYTCHM EA ADD ANTB: CPT | Performed by: PATHOLOGY

## 2023-07-11 PROCEDURE — 88305 TISSUE EXAM BY PATHOLOGIST: CPT | Performed by: PATHOLOGY

## 2023-07-11 PROCEDURE — 88312 SPECIAL STAINS GROUP 1: CPT | Performed by: PATHOLOGY

## 2023-07-11 PROCEDURE — 88342 IMHCHEM/IMCYTCHM 1ST ANTB: CPT | Performed by: PATHOLOGY

## 2023-07-11 PROCEDURE — 43239 EGD BIOPSY SINGLE/MULTIPLE: CPT | Performed by: INTERNAL MEDICINE

## 2023-07-11 RX ORDER — LIOTHYRONINE SODIUM 5 UG/1
TAKE 1 TABLET DAILY TABLET ORAL
Refills: 0 | Status: ON HOLD | COMMUNITY
Start: 2018-12-04

## 2023-07-11 RX ORDER — ZOLMITRIPTAN 5 MG/1
TAKE 1 TABLET AT ONSET OF MIGRAINE. MAY REPEAT ONCE AFTER 2 HOURS. MAX 10 MG/DAY TABLET, FILM COATED ORAL
Refills: 0 | Status: ON HOLD | COMMUNITY
Start: 2018-08-08

## 2023-07-11 RX ORDER — HYDROCODONE BITARTRATE AND ACETAMINOPHEN 5; 325 MG/1; MG/1
TAKE 1 TABLET BY MOUTH EVERY 6 HOURS TABLET ORAL
Qty: 40 | Refills: 0 | Status: ON HOLD | COMMUNITY
Start: 2018-06-14

## 2023-07-11 RX ORDER — ESTRADIOL 0.1 MG/D
APPLY 1 PATCH TWICE WEEKLY AS DIRECTED FILM, EXTENDED RELEASE TRANSDERMAL
Refills: 0 | Status: ACTIVE | COMMUNITY
Start: 2018-08-20

## 2023-07-11 RX ORDER — ALBUTEROL SULFATE 90 UG/1
INHALE 1-2 PUFFS INTO THE LUNGS 4 TIMES DAILY AS NEEDED AEROSOL, METERED RESPIRATORY (INHALATION)
Qty: 9 | Refills: 0 | Status: ON HOLD | COMMUNITY
Start: 2018-05-12

## 2023-07-11 RX ORDER — ESCITALOPRAM 20 MG/1
TAKE 1 TABLET BY MOUTH EVERY MORNING TABLET, FILM COATED ORAL
Qty: 30 | Refills: 0 | Status: ON HOLD | COMMUNITY
Start: 2018-10-22

## 2023-07-11 RX ORDER — LEVOFLOXACIN 750 MG/1
TAKE 1 TABLET BY MOUTH EVERY DAY FOR 10 DAYS TABLET, FILM COATED ORAL
Qty: 10 | Refills: 0 | Status: ON HOLD | COMMUNITY
Start: 2019-01-02

## 2023-07-11 RX ORDER — SULFAMETHOXAZOLE AND TRIMETHOPRIM 800; 160 MG/1; MG/1
TABLET ORAL
Qty: 20 | Refills: 0 | Status: ON HOLD | COMMUNITY
Start: 2018-04-05

## 2023-07-11 RX ORDER — TIZANIDINE 4 MG/1
TAKE 1 TABLET BY MOUTH 3 TIMES A DAY AS NEEDED TABLET ORAL
Qty: 90 | Refills: 0 | Status: ON HOLD | COMMUNITY
Start: 2017-10-31

## 2023-07-11 RX ORDER — UBROGEPANT 100 MG/1
1 TABLET MAY TAKE SECOND DOSE AT LEAST 2 HOURS AFTER FIRST DOSE AS NEEDED TABLET ORAL ONCE A DAY
Status: ACTIVE | COMMUNITY

## 2023-07-11 RX ORDER — VENLAFAXINE HYDROCHLORIDE 150 MG/1
CAPSULE, EXTENDED RELEASE ORAL
Qty: 30 | Refills: 0 | Status: ON HOLD | COMMUNITY
Start: 2018-11-12

## 2023-07-11 RX ORDER — AZELASTINE HCL 205.5 UG/1
INSTILL 1 SPRAY IN EACH NOSTRIL TWICE DAILY AS NEEDED FOR CONGESTION SPRAY NASAL
Qty: 30 | Refills: 0 | Status: ON HOLD | COMMUNITY
Start: 2018-04-05

## 2023-07-11 RX ORDER — HYDROXYZINE HYDROCHLORIDE 50 MG/1
TAKE 1 TABLET BY MOUTH 3 TIMES A DAY AS NEEDED FOR ANXIETY TABLET, FILM COATED ORAL
Qty: 90 | Refills: 0 | Status: ON HOLD | COMMUNITY
Start: 2018-05-01

## 2023-07-11 RX ORDER — TOPIRAMATE 100 MG/1
TABLET, FILM COATED ORAL
Qty: 180 | Refills: 0 | Status: ON HOLD | COMMUNITY
Start: 2018-09-13

## 2023-07-11 RX ORDER — HYDROXYZINE HYDROCHLORIDE 25 MG/1
TAKE 1 TABLET BY MOUTH AS NEEDED 1 HOUR PRIOR TO BEDTIME TABLET, FILM COATED ORAL
Qty: 30 | Refills: 0 | Status: ON HOLD | COMMUNITY
Start: 2019-01-02

## 2023-07-11 RX ORDER — CLONIDINE HYDROCHLORIDE 0.1 MG/1
TAKE 1 TABLET BY MOUTH AT BEDTIME TABLET ORAL
Qty: 30 | Refills: 0 | Status: ON HOLD | COMMUNITY
Start: 2018-08-01

## 2023-07-11 RX ORDER — PRAZOSIN HYDROCHLORIDE 1 MG/1
TAKE 1 TO 2 CAPSULES BY MOUTH AT BEDTIME CAPSULE ORAL
Qty: 60 | Refills: 0 | Status: ON HOLD | COMMUNITY
Start: 2017-11-27

## 2023-07-11 RX ORDER — PROMETHAZINE HYDROCHLORIDE 12.5 MG/1
TAKE 1 TABLET BY MOUTH 3 TIMES A DAY AS NEEDED TABLET ORAL
Qty: 90 | Refills: 0 | Status: ON HOLD | COMMUNITY
Start: 2018-11-12

## 2023-07-11 RX ORDER — ALBUTEROL SULFATE 90 UG/1
INHALE 1 TO 2 PUFFS EVERY 4 TO 6 HOURS AS NEEDED AEROSOL, METERED RESPIRATORY (INHALATION)
Refills: 0 | Status: ON HOLD | COMMUNITY
Start: 2019-01-02

## 2023-07-11 RX ORDER — METFORMIN HYDROCHLORIDE 850 MG/1
1 TABLET WITH A MEAL TABLET, FILM COATED ORAL ONCE A DAY
Status: ACTIVE | COMMUNITY

## 2023-07-11 RX ORDER — FLUOXETINE 20 MG/1
TAKE 3 CAPSULES BY MOUTH EVERY DAY CAPSULE ORAL
Qty: 90 | Refills: 0 | Status: ON HOLD | COMMUNITY
Start: 2018-08-01

## 2023-07-11 RX ORDER — LEVOTHYROXINE SODIUM 0.07 MG/1
TAKE 1 TABLET DAILY TABLET ORAL
Refills: 0 | Status: ON HOLD | COMMUNITY
Start: 2018-08-15

## 2023-07-11 RX ORDER — TRAZODONE HYDROCHLORIDE 150 MG/1
TAKE 1 TABLET ONCE DAILY TABLET ORAL
Refills: 0 | Status: ON HOLD | COMMUNITY
Start: 2018-08-28

## 2023-07-11 RX ORDER — SUMATRIPTAN SUCCINATE 50 MG/1
TAKE 1 TABLET AT ONSET OF MIGRAINE HEADACHE.  MAY REPEAT IN 2 HOURS IF NEEDED TABLET, FILM COATED ORAL
Refills: 0 | Status: ON HOLD | COMMUNITY
Start: 2018-09-06

## 2023-07-11 RX ORDER — CODEINE PHOSPHATE AND GUAIFENESIN 10; 100 MG/5ML; MG/5ML
TAKE 5 ML BY MOUTH EVERY 4 HOURS FOR 10 DAYS SOLUTION ORAL
Qty: 300 | Refills: 0 | Status: ON HOLD | COMMUNITY
Start: 2018-11-12

## 2023-07-11 RX ORDER — PROMETHAZINE HYDROCHLORIDE 25 MG/1
TAKE 1 TABLET EVERY 6 HOURS AS NEEDED FOR NAUSEA TABLET ORAL
Refills: 0 | Status: ON HOLD | COMMUNITY
Start: 2018-10-12

## 2023-07-11 RX ORDER — DOXYCYCLINE HYCLATE 100 MG/1
TABLET ORAL
Qty: 20 | Refills: 0 | Status: ON HOLD | COMMUNITY
Start: 2018-05-12

## 2023-07-11 RX ORDER — TRAZODONE HYDROCHLORIDE 300 MG/1
1 TABLET AT BEDTIME AS NEEDED TABLET ORAL ONCE A DAY
Refills: 0 | Status: ACTIVE | COMMUNITY
Start: 2018-02-19

## 2023-07-11 RX ORDER — ERENUMAB-AOOE 70 MG/ML
INJECT MG ONCE INJECTION SUBCUTANEOUS
Refills: 0 | Status: ON HOLD | COMMUNITY
Start: 2018-10-12

## 2023-07-11 RX ORDER — PROPRANOLOL HYDROCHLORIDE 80 MG/1
TAKE 1 CAPSULE DAILY CAPSULE, EXTENDED RELEASE ORAL
Refills: 0 | Status: ACTIVE | COMMUNITY
Start: 2018-08-15

## 2023-07-11 RX ORDER — OXYCODONE AND ACETAMINOPHEN 5; 325 MG/1; MG/1
TAKE 1 OR 2 TABLETS BY MOUTH EVERY 4 HOURS AS NEEDED TABLET ORAL
Qty: 40 | Refills: 0 | Status: ON HOLD | COMMUNITY
Start: 2018-06-02

## 2023-07-11 RX ORDER — PROMETHAZINE HYDROCHLORIDE AND DEXTROMETHORPHAN HYDROBROMIDE 6.25; 15 MG/5ML; MG/5ML
TAKE 5 ML (ORAL) EVERY 6 HOURS FOR 4 DAYS SOLUTION ORAL
Qty: 80 | Refills: 0 | Status: ON HOLD | COMMUNITY
Start: 2018-04-05

## 2023-07-11 RX ORDER — ATORVASTATIN CALCIUM 80 MG/1
1 TABLET TABLET, FILM COATED ORAL ONCE A DAY
Status: ACTIVE | COMMUNITY

## 2023-07-11 RX ORDER — VENLAFAXINE HYDROCHLORIDE 75 MG/1
TAKE ONE CAPSULE BY MOUTH EVERY DAY AT SAME TIME AS 150MG CAPSULE, EXTENDED RELEASE ORAL
Qty: 30 | Refills: 0 | Status: ON HOLD | COMMUNITY
Start: 2018-11-12

## 2023-07-11 RX ORDER — INDOMETHACIN 50 MG/1
TAKE 1 CAPSULE BY MOUTH TWICE A DAY WITH FOOD OR MILK CAPSULE ORAL
Qty: 60 | Refills: 0 | Status: ON HOLD | COMMUNITY
Start: 2019-03-29

## 2023-07-11 RX ORDER — METHYLPREDNISOLONE 4 MG/1
TAKE 6 TABLETS ON DAY 1 AS DIRECTED ON PACKAGE AND DECREASE BY 1 TAB E TABLET ORAL
Qty: 21 | Refills: 0 | Status: ON HOLD | COMMUNITY
Start: 2018-04-05

## 2023-07-11 RX ORDER — ALPRAZOLAM 1 MG/1
TAKE 1 TABLET  PRN TABLET ORAL
Refills: 0 | Status: ON HOLD | COMMUNITY
Start: 2018-02-19

## 2023-07-11 RX ORDER — BUSPIRONE HYDROCHLORIDE 10 MG/1
TAKE 1 TABLET BY MOUTH TWICE A DAY TABLET ORAL
Qty: 60 | Refills: 0 | Status: ON HOLD | COMMUNITY
Start: 2018-08-01

## 2023-07-11 RX ORDER — FLUOXETINE HYDROCHLORIDE 40 MG/1
TAKE ONE CAPSULE BY MOUTH EVERY DAY CAPSULE ORAL
Qty: 30 | Refills: 0 | Status: ON HOLD | COMMUNITY
Start: 2018-02-19

## 2023-07-11 RX ORDER — OXYCODONE AND ACETAMINOPHEN 7.5; 325 MG/1; MG/1
TABLET ORAL
Qty: 20 | Refills: 0 | Status: ON HOLD | COMMUNITY
Start: 2018-05-17

## 2023-07-11 RX ORDER — GALCANEZUMAB 120 MG/ML
INJECT 2 PENS SUBCUTANEOUSLY AS DIRECTED FOR LOADING DOSE INJECTION, SOLUTION SUBCUTANEOUS
Qty: 2 | Refills: 0 | Status: ON HOLD | COMMUNITY
Start: 2019-02-21

## 2023-10-02 ENCOUNTER — OFFICE VISIT (OUTPATIENT)
Dept: URBAN - METROPOLITAN AREA CLINIC 113 | Facility: CLINIC | Age: 58
End: 2023-10-02
Payer: COMMERCIAL

## 2023-10-02 ENCOUNTER — LAB OUTSIDE AN ENCOUNTER (OUTPATIENT)
Dept: URBAN - METROPOLITAN AREA CLINIC 113 | Facility: CLINIC | Age: 58
End: 2023-10-02

## 2023-10-02 VITALS
HEIGHT: 65 IN | BODY MASS INDEX: 37.32 KG/M2 | RESPIRATION RATE: 20 BRPM | SYSTOLIC BLOOD PRESSURE: 135 MMHG | WEIGHT: 224 LBS | HEART RATE: 67 BPM | DIASTOLIC BLOOD PRESSURE: 91 MMHG

## 2023-10-02 DIAGNOSIS — K59.09 CHANGE IN BOWEL MOVEMENTS INTERMITTENT CONSTIPATION. URGENCY IN THE MORNING.: ICD-10-CM

## 2023-10-02 DIAGNOSIS — D50.8 ACHLORHYDRIC ANEMIA: ICD-10-CM

## 2023-10-02 DIAGNOSIS — K57.30 DIVERTICULOSIS OF COLON: ICD-10-CM

## 2023-10-02 DIAGNOSIS — K31.A0 INTESTINAL METAPLASIA OF GASTRIC MUCOSA: ICD-10-CM

## 2023-10-02 PROBLEM — 724556004: Status: ACTIVE | Noted: 2023-10-02

## 2023-10-02 PROCEDURE — 99214 OFFICE O/P EST MOD 30 MIN: CPT | Performed by: NURSE PRACTITIONER

## 2023-10-02 RX ORDER — ALBUTEROL SULFATE 90 UG/1
INHALE 1 TO 2 PUFFS EVERY 4 TO 6 HOURS AS NEEDED AEROSOL, METERED RESPIRATORY (INHALATION)
Refills: 0 | Status: ON HOLD | COMMUNITY
Start: 2019-01-02

## 2023-10-02 RX ORDER — TOPIRAMATE 100 MG/1
TABLET, FILM COATED ORAL
Qty: 180 | Refills: 0 | Status: ON HOLD | COMMUNITY
Start: 2018-09-13

## 2023-10-02 RX ORDER — PROMETHAZINE HYDROCHLORIDE 25 MG/1
TAKE 1 TABLET EVERY 6 HOURS AS NEEDED FOR NAUSEA TABLET ORAL
Refills: 0 | Status: ON HOLD | COMMUNITY
Start: 2018-10-12

## 2023-10-02 RX ORDER — HYDROCODONE BITARTRATE AND ACETAMINOPHEN 5; 325 MG/1; MG/1
TAKE 1 TABLET BY MOUTH EVERY 6 HOURS TABLET ORAL
Qty: 40 | Refills: 0 | Status: ON HOLD | COMMUNITY
Start: 2018-06-14

## 2023-10-02 RX ORDER — METHYLPREDNISOLONE 4 MG/1
TAKE 6 TABLETS ON DAY 1 AS DIRECTED ON PACKAGE AND DECREASE BY 1 TAB E TABLET ORAL
Qty: 21 | Refills: 0 | Status: ON HOLD | COMMUNITY
Start: 2018-04-05

## 2023-10-02 RX ORDER — LEVOFLOXACIN 750 MG/1
TAKE 1 TABLET BY MOUTH EVERY DAY FOR 10 DAYS TABLET, FILM COATED ORAL
Qty: 10 | Refills: 0 | Status: ON HOLD | COMMUNITY
Start: 2019-01-02

## 2023-10-02 RX ORDER — PROPRANOLOL HYDROCHLORIDE 80 MG/1
TAKE 1 CAPSULE DAILY CAPSULE, EXTENDED RELEASE ORAL
Refills: 0 | Status: ACTIVE | COMMUNITY
Start: 2018-08-15

## 2023-10-02 RX ORDER — CODEINE PHOSPHATE AND GUAIFENESIN 10; 100 MG/5ML; MG/5ML
TAKE 5 ML BY MOUTH EVERY 4 HOURS FOR 10 DAYS SOLUTION ORAL
Qty: 300 | Refills: 0 | Status: ON HOLD | COMMUNITY
Start: 2018-11-12

## 2023-10-02 RX ORDER — HYDROXYZINE HYDROCHLORIDE 25 MG/1
TAKE 1 TABLET BY MOUTH AS NEEDED 1 HOUR PRIOR TO BEDTIME TABLET, FILM COATED ORAL
Qty: 30 | Refills: 0 | Status: ON HOLD | COMMUNITY
Start: 2019-01-02

## 2023-10-02 RX ORDER — GALCANEZUMAB 120 MG/ML
INJECT 2 PENS SUBCUTANEOUSLY AS DIRECTED FOR LOADING DOSE INJECTION, SOLUTION SUBCUTANEOUS
Qty: 2 | Refills: 0 | Status: ON HOLD | COMMUNITY
Start: 2019-02-21

## 2023-10-02 RX ORDER — METFORMIN HYDROCHLORIDE 850 MG/1
1 TABLET WITH A MEAL TABLET, FILM COATED ORAL ONCE A DAY
Status: ACTIVE | COMMUNITY

## 2023-10-02 RX ORDER — ALBUTEROL SULFATE 90 UG/1
INHALE 1-2 PUFFS INTO THE LUNGS 4 TIMES DAILY AS NEEDED AEROSOL, METERED RESPIRATORY (INHALATION)
Qty: 9 | Refills: 0 | Status: ON HOLD | COMMUNITY
Start: 2018-05-12

## 2023-10-02 RX ORDER — BUSPIRONE HYDROCHLORIDE 10 MG/1
TAKE 1 TABLET BY MOUTH TWICE A DAY TABLET ORAL
Qty: 60 | Refills: 0 | Status: ON HOLD | COMMUNITY
Start: 2018-08-01

## 2023-10-02 RX ORDER — SULFAMETHOXAZOLE AND TRIMETHOPRIM 800; 160 MG/1; MG/1
TABLET ORAL
Qty: 20 | Refills: 0 | Status: ON HOLD | COMMUNITY
Start: 2018-04-05

## 2023-10-02 RX ORDER — CLONIDINE HYDROCHLORIDE 0.1 MG/1
TAKE 1 TABLET BY MOUTH AT BEDTIME TABLET ORAL
Qty: 30 | Refills: 0 | Status: ON HOLD | COMMUNITY
Start: 2018-08-01

## 2023-10-02 RX ORDER — VENLAFAXINE HYDROCHLORIDE 75 MG/1
TAKE ONE CAPSULE BY MOUTH EVERY DAY AT SAME TIME AS 150MG CAPSULE, EXTENDED RELEASE ORAL
Qty: 30 | Refills: 0 | Status: ON HOLD | COMMUNITY
Start: 2018-11-12

## 2023-10-02 RX ORDER — HYDROXYZINE HYDROCHLORIDE 50 MG/1
TAKE 1 TABLET BY MOUTH 3 TIMES A DAY AS NEEDED FOR ANXIETY TABLET, FILM COATED ORAL
Qty: 90 | Refills: 0 | Status: ON HOLD | COMMUNITY
Start: 2018-05-01

## 2023-10-02 RX ORDER — ESCITALOPRAM 20 MG/1
TAKE 1 TABLET BY MOUTH EVERY MORNING TABLET, FILM COATED ORAL
Qty: 30 | Refills: 0 | Status: ON HOLD | COMMUNITY
Start: 2018-10-22

## 2023-10-02 RX ORDER — PRAZOSIN HYDROCHLORIDE 1 MG/1
TAKE 1 TO 2 CAPSULES BY MOUTH AT BEDTIME CAPSULE ORAL
Qty: 60 | Refills: 0 | Status: ON HOLD | COMMUNITY
Start: 2017-11-27

## 2023-10-02 RX ORDER — ALPRAZOLAM 1 MG/1
TAKE 1 TABLET  PRN TABLET ORAL
Refills: 0 | Status: ON HOLD | COMMUNITY
Start: 2018-02-19

## 2023-10-02 RX ORDER — LIOTHYRONINE SODIUM 5 UG/1
TAKE 1 TABLET DAILY TABLET ORAL
Refills: 0 | Status: ON HOLD | COMMUNITY
Start: 2018-12-04

## 2023-10-02 RX ORDER — PROMETHAZINE HYDROCHLORIDE AND DEXTROMETHORPHAN HYDROBROMIDE 6.25; 15 MG/5ML; MG/5ML
TAKE 5 ML (ORAL) EVERY 6 HOURS FOR 4 DAYS SOLUTION ORAL
Qty: 80 | Refills: 0 | Status: ON HOLD | COMMUNITY
Start: 2018-04-05

## 2023-10-02 RX ORDER — VENLAFAXINE HYDROCHLORIDE 150 MG/1
CAPSULE, EXTENDED RELEASE ORAL
Qty: 30 | Refills: 0 | Status: ON HOLD | COMMUNITY
Start: 2018-11-12

## 2023-10-02 RX ORDER — FLUOXETINE 20 MG/1
TAKE 3 CAPSULES BY MOUTH EVERY DAY CAPSULE ORAL
Qty: 90 | Refills: 0 | Status: ON HOLD | COMMUNITY
Start: 2018-08-01

## 2023-10-02 RX ORDER — PROMETHAZINE HYDROCHLORIDE 12.5 MG/1
TAKE 1 TABLET BY MOUTH 3 TIMES A DAY AS NEEDED TABLET ORAL
Qty: 90 | Refills: 0 | Status: ON HOLD | COMMUNITY
Start: 2018-11-12

## 2023-10-02 RX ORDER — AZELASTINE HCL 205.5 UG/1
INSTILL 1 SPRAY IN EACH NOSTRIL TWICE DAILY AS NEEDED FOR CONGESTION SPRAY NASAL
Qty: 30 | Refills: 0 | Status: ON HOLD | COMMUNITY
Start: 2018-04-05

## 2023-10-02 RX ORDER — OXYCODONE AND ACETAMINOPHEN 5; 325 MG/1; MG/1
TAKE 1 OR 2 TABLETS BY MOUTH EVERY 4 HOURS AS NEEDED TABLET ORAL
Qty: 40 | Refills: 0 | Status: ON HOLD | COMMUNITY
Start: 2018-06-02

## 2023-10-02 RX ORDER — UBROGEPANT 100 MG/1
1 TABLET MAY TAKE SECOND DOSE AT LEAST 2 HOURS AFTER FIRST DOSE AS NEEDED TABLET ORAL ONCE A DAY
Status: ACTIVE | COMMUNITY

## 2023-10-02 RX ORDER — ERENUMAB-AOOE 70 MG/ML
INJECT MG ONCE INJECTION SUBCUTANEOUS
Refills: 0 | Status: ON HOLD | COMMUNITY
Start: 2018-10-12

## 2023-10-02 RX ORDER — TIZANIDINE 4 MG/1
TAKE 1 TABLET BY MOUTH 3 TIMES A DAY AS NEEDED TABLET ORAL
Qty: 90 | Refills: 0 | Status: ON HOLD | COMMUNITY
Start: 2017-10-31

## 2023-10-02 RX ORDER — DOXYCYCLINE HYCLATE 100 MG/1
TABLET ORAL
Qty: 20 | Refills: 0 | Status: ON HOLD | COMMUNITY
Start: 2018-05-12

## 2023-10-02 RX ORDER — ATORVASTATIN CALCIUM 80 MG/1
1 TABLET TABLET, FILM COATED ORAL ONCE A DAY
Status: ACTIVE | COMMUNITY

## 2023-10-02 RX ORDER — INDOMETHACIN 50 MG/1
TAKE 1 CAPSULE BY MOUTH TWICE A DAY WITH FOOD OR MILK CAPSULE ORAL
Qty: 60 | Refills: 0 | Status: ON HOLD | COMMUNITY
Start: 2019-03-29

## 2023-10-02 RX ORDER — ESTRADIOL 0.1 MG/D
APPLY 1 PATCH TWICE WEEKLY AS DIRECTED FILM, EXTENDED RELEASE TRANSDERMAL
Refills: 0 | Status: ACTIVE | COMMUNITY
Start: 2018-08-20

## 2023-10-02 RX ORDER — TRAZODONE HYDROCHLORIDE 300 MG/1
1 TABLET AT BEDTIME AS NEEDED TABLET ORAL ONCE A DAY
Refills: 0 | Status: ACTIVE | COMMUNITY
Start: 2018-02-19

## 2023-10-02 RX ORDER — SUMATRIPTAN SUCCINATE 50 MG/1
TAKE 1 TABLET AT ONSET OF MIGRAINE HEADACHE.  MAY REPEAT IN 2 HOURS IF NEEDED TABLET, FILM COATED ORAL
Refills: 0 | Status: ON HOLD | COMMUNITY
Start: 2018-09-06

## 2023-10-02 RX ORDER — OXYCODONE AND ACETAMINOPHEN 7.5; 325 MG/1; MG/1
TABLET ORAL
Qty: 20 | Refills: 0 | Status: ON HOLD | COMMUNITY
Start: 2018-05-17

## 2023-10-02 RX ORDER — ZOLMITRIPTAN 5 MG/1
TAKE 1 TABLET AT ONSET OF MIGRAINE. MAY REPEAT ONCE AFTER 2 HOURS. MAX 10 MG/DAY TABLET, FILM COATED ORAL
Refills: 0 | Status: ON HOLD | COMMUNITY
Start: 2018-08-08

## 2023-10-02 RX ORDER — FLUOXETINE HYDROCHLORIDE 40 MG/1
TAKE ONE CAPSULE BY MOUTH EVERY DAY CAPSULE ORAL
Qty: 30 | Refills: 0 | Status: ON HOLD | COMMUNITY
Start: 2018-02-19

## 2023-10-02 RX ORDER — LEVOTHYROXINE SODIUM 0.07 MG/1
TAKE 1 TABLET DAILY TABLET ORAL
Refills: 0 | Status: ON HOLD | COMMUNITY
Start: 2018-08-15

## 2023-10-02 RX ORDER — TRAZODONE HYDROCHLORIDE 150 MG/1
TAKE 1 TABLET ONCE DAILY TABLET ORAL
Refills: 0 | Status: ON HOLD | COMMUNITY
Start: 2018-08-28

## 2023-10-02 NOTE — HPI-TODAY'S VISIT:
This is a 58-year-old female with a history of hypertension, hyperlipidemia, hypothyroidism, iron deficiency anemia, and constipation predominant change in bowel habits presenting for follow-up. She was last seen in the office 3/24/2023 referred from Dr. Shaikh for evaluation of anemia.  She had a history of iron deficiency anemia attributed to AVMs in the colon although the small bowel was never evaluated.  She presented with recurrent iron deficiency.  There was no overt bleeding.  She reported a constipation predominant change in bowel habits.  Colonoscopy was scheduled.  The plan was to proceed with EGD and small bowel enteroscopy followed by capsule enteroscopy to complete her evaluation. EGD 7/11/2023:Normal esophagus, chronic gastritis status postbiopsy, atrophic mucosa in the gastric body status post biopsy, no gross lesions in the duodenum status post biopsy.  Pathology: Duodenal biopsies demonstrated no significant abdomen biopsy showed chemical/reactive gastropathy without H. pylori or intestinal metaplasia.  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  Colonoscopy for screening recommended in 2033. She reports a normal B12 level per her primary care physician.  She denies dysphagia, heartburn, abdominal pain.  She has occasional nausea and vomiting associated with migraines.  She typically takes Ubrelvy for migraines.  She has been out of medication and is waiting for prior authorization.  She has been using Tylenol and occasionally has used Aleve or ibuprofen.  She typically does not use these medications when Ubrelvy is available. She has been taking Linzess 145 mcg daily.  She reports a formed bowel movement after taking it.  This may progress to soft or watery.  She feels as though she has to splint her vagina in order to pass stool.  She continues to report hard stools intermittently and occasional straining.  She is taking Benefiber 2 tablespoons daily.  She has tried a lower dose Linzess and reports it is completely ineffective.  She is interested in another medication that may cause less loose stool.

## 2023-10-07 PROBLEM — 733657002: Status: ACTIVE | Noted: 2023-10-07

## 2023-10-07 PROBLEM — 72519002: Status: ACTIVE | Noted: 2023-10-07

## 2023-10-07 PROBLEM — 82934008: Status: ACTIVE | Noted: 2023-10-07

## 2023-10-07 PROBLEM — 305058001: Status: ACTIVE | Noted: 2023-10-07

## 2023-10-18 ENCOUNTER — OFFICE VISIT (OUTPATIENT)
Dept: URBAN - METROPOLITAN AREA CLINIC 112 | Facility: CLINIC | Age: 58
End: 2023-10-18

## 2023-10-18 ENCOUNTER — OFFICE VISIT (OUTPATIENT)
Dept: URBAN - METROPOLITAN AREA CLINIC 112 | Facility: CLINIC | Age: 58
End: 2023-10-18
Payer: COMMERCIAL

## 2023-10-18 VITALS
TEMPERATURE: 98.1 F | BODY MASS INDEX: 37.32 KG/M2 | DIASTOLIC BLOOD PRESSURE: 107 MMHG | WEIGHT: 224 LBS | RESPIRATION RATE: 18 BRPM | SYSTOLIC BLOOD PRESSURE: 135 MMHG | HEART RATE: 78 BPM | HEIGHT: 65 IN

## 2023-10-18 DIAGNOSIS — D50.0 1. ANEMIA, IRON DEFICIENCY FROM CHRONIC BLOOD LOSS:: ICD-10-CM

## 2023-10-18 PROCEDURE — 91110 GI TRC IMG INTRAL ESOPH-ILE: CPT | Performed by: INTERNAL MEDICINE

## 2023-10-18 RX ORDER — VENLAFAXINE HYDROCHLORIDE 75 MG/1
TAKE ONE CAPSULE BY MOUTH EVERY DAY AT SAME TIME AS 150MG CAPSULE, EXTENDED RELEASE ORAL
Qty: 30 | Refills: 0 | Status: ON HOLD | COMMUNITY
Start: 2018-11-12

## 2023-10-18 RX ORDER — FLUOXETINE HYDROCHLORIDE 40 MG/1
TAKE ONE CAPSULE BY MOUTH EVERY DAY CAPSULE ORAL
Qty: 30 | Refills: 0 | Status: ON HOLD | COMMUNITY
Start: 2018-02-19

## 2023-10-18 RX ORDER — CLONIDINE HYDROCHLORIDE 0.1 MG/1
TAKE 1 TABLET BY MOUTH AT BEDTIME TABLET ORAL
Qty: 30 | Refills: 0 | Status: ON HOLD | COMMUNITY
Start: 2018-08-01

## 2023-10-18 RX ORDER — PROMETHAZINE HYDROCHLORIDE AND DEXTROMETHORPHAN HYDROBROMIDE 6.25; 15 MG/5ML; MG/5ML
TAKE 5 ML (ORAL) EVERY 6 HOURS FOR 4 DAYS SOLUTION ORAL
Qty: 80 | Refills: 0 | Status: ON HOLD | COMMUNITY
Start: 2018-04-05

## 2023-10-18 RX ORDER — TRAZODONE HYDROCHLORIDE 150 MG/1
TAKE 1 TABLET ONCE DAILY TABLET ORAL
Refills: 0 | Status: ON HOLD | COMMUNITY
Start: 2018-08-28

## 2023-10-18 RX ORDER — DOXYCYCLINE HYCLATE 100 MG/1
TABLET ORAL
Qty: 20 | Refills: 0 | Status: ON HOLD | COMMUNITY
Start: 2018-05-12

## 2023-10-18 RX ORDER — BUSPIRONE HYDROCHLORIDE 10 MG/1
TAKE 1 TABLET BY MOUTH TWICE A DAY TABLET ORAL
Qty: 60 | Refills: 0 | Status: ON HOLD | COMMUNITY
Start: 2018-08-01

## 2023-10-18 RX ORDER — TOPIRAMATE 100 MG/1
TABLET, FILM COATED ORAL
Qty: 180 | Refills: 0 | Status: ON HOLD | COMMUNITY
Start: 2018-09-13

## 2023-10-18 RX ORDER — LIOTHYRONINE SODIUM 5 UG/1
TAKE 1 TABLET DAILY TABLET ORAL
Refills: 0 | Status: ON HOLD | COMMUNITY
Start: 2018-12-04

## 2023-10-18 RX ORDER — HYDROCODONE BITARTRATE AND ACETAMINOPHEN 5; 325 MG/1; MG/1
TAKE 1 TABLET BY MOUTH EVERY 6 HOURS TABLET ORAL
Qty: 40 | Refills: 0 | Status: ON HOLD | COMMUNITY
Start: 2018-06-14

## 2023-10-18 RX ORDER — AZELASTINE HCL 205.5 UG/1
INSTILL 1 SPRAY IN EACH NOSTRIL TWICE DAILY AS NEEDED FOR CONGESTION SPRAY NASAL
Qty: 30 | Refills: 0 | Status: ON HOLD | COMMUNITY
Start: 2018-04-05

## 2023-10-18 RX ORDER — VENLAFAXINE HYDROCHLORIDE 150 MG/1
CAPSULE, EXTENDED RELEASE ORAL
Qty: 30 | Refills: 0 | Status: ON HOLD | COMMUNITY
Start: 2018-11-12

## 2023-10-18 RX ORDER — OXYCODONE AND ACETAMINOPHEN 7.5; 325 MG/1; MG/1
TABLET ORAL
Qty: 20 | Refills: 0 | Status: ON HOLD | COMMUNITY
Start: 2018-05-17

## 2023-10-18 RX ORDER — CODEINE PHOSPHATE AND GUAIFENESIN 10; 100 MG/5ML; MG/5ML
TAKE 5 ML BY MOUTH EVERY 4 HOURS FOR 10 DAYS SOLUTION ORAL
Qty: 300 | Refills: 0 | Status: ON HOLD | COMMUNITY
Start: 2018-11-12

## 2023-10-18 RX ORDER — LEVOFLOXACIN 750 MG/1
TAKE 1 TABLET BY MOUTH EVERY DAY FOR 10 DAYS TABLET, FILM COATED ORAL
Qty: 10 | Refills: 0 | Status: ON HOLD | COMMUNITY
Start: 2019-01-02

## 2023-10-18 RX ORDER — ATORVASTATIN CALCIUM 80 MG/1
1 TABLET TABLET, FILM COATED ORAL ONCE A DAY
Status: ACTIVE | COMMUNITY

## 2023-10-18 RX ORDER — HYDROXYZINE HYDROCHLORIDE 25 MG/1
TAKE 1 TABLET BY MOUTH AS NEEDED 1 HOUR PRIOR TO BEDTIME TABLET, FILM COATED ORAL
Qty: 30 | Refills: 0 | Status: ON HOLD | COMMUNITY
Start: 2019-01-02

## 2023-10-18 RX ORDER — OXYCODONE AND ACETAMINOPHEN 5; 325 MG/1; MG/1
TAKE 1 OR 2 TABLETS BY MOUTH EVERY 4 HOURS AS NEEDED TABLET ORAL
Qty: 40 | Refills: 0 | Status: ON HOLD | COMMUNITY
Start: 2018-06-02

## 2023-10-18 RX ORDER — ALBUTEROL SULFATE 90 UG/1
INHALE 1 TO 2 PUFFS EVERY 4 TO 6 HOURS AS NEEDED AEROSOL, METERED RESPIRATORY (INHALATION)
Refills: 0 | Status: ON HOLD | COMMUNITY
Start: 2019-01-02

## 2023-10-18 RX ORDER — PRAZOSIN HYDROCHLORIDE 1 MG/1
TAKE 1 TO 2 CAPSULES BY MOUTH AT BEDTIME CAPSULE ORAL
Qty: 60 | Refills: 0 | Status: ON HOLD | COMMUNITY
Start: 2017-11-27

## 2023-10-18 RX ORDER — TRAZODONE HYDROCHLORIDE 300 MG/1
1 TABLET AT BEDTIME AS NEEDED TABLET ORAL ONCE A DAY
Refills: 0 | Status: ACTIVE | COMMUNITY
Start: 2018-02-19

## 2023-10-18 RX ORDER — ALBUTEROL SULFATE 90 UG/1
INHALE 1-2 PUFFS INTO THE LUNGS 4 TIMES DAILY AS NEEDED AEROSOL, METERED RESPIRATORY (INHALATION)
Qty: 9 | Refills: 0 | Status: ON HOLD | COMMUNITY
Start: 2018-05-12

## 2023-10-18 RX ORDER — FLUOXETINE 20 MG/1
TAKE 3 CAPSULES BY MOUTH EVERY DAY CAPSULE ORAL
Qty: 90 | Refills: 0 | Status: ON HOLD | COMMUNITY
Start: 2018-08-01

## 2023-10-18 RX ORDER — UBROGEPANT 100 MG/1
1 TABLET MAY TAKE SECOND DOSE AT LEAST 2 HOURS AFTER FIRST DOSE AS NEEDED TABLET ORAL ONCE A DAY
Status: ACTIVE | COMMUNITY

## 2023-10-18 RX ORDER — PROMETHAZINE HYDROCHLORIDE 25 MG/1
TAKE 1 TABLET EVERY 6 HOURS AS NEEDED FOR NAUSEA TABLET ORAL
Refills: 0 | Status: ON HOLD | COMMUNITY
Start: 2018-10-12

## 2023-10-18 RX ORDER — METFORMIN HYDROCHLORIDE 850 MG/1
1 TABLET WITH A MEAL TABLET, FILM COATED ORAL ONCE A DAY
Status: ACTIVE | COMMUNITY

## 2023-10-18 RX ORDER — GALCANEZUMAB 120 MG/ML
INJECT 2 PENS SUBCUTANEOUSLY AS DIRECTED FOR LOADING DOSE INJECTION, SOLUTION SUBCUTANEOUS
Qty: 2 | Refills: 0 | Status: ON HOLD | COMMUNITY
Start: 2019-02-21

## 2023-10-18 RX ORDER — PROPRANOLOL HYDROCHLORIDE 80 MG/1
TAKE 1 CAPSULE DAILY CAPSULE, EXTENDED RELEASE ORAL
Refills: 0 | Status: ACTIVE | COMMUNITY
Start: 2018-08-15

## 2023-10-18 RX ORDER — HYDROXYZINE HYDROCHLORIDE 50 MG/1
TAKE 1 TABLET BY MOUTH 3 TIMES A DAY AS NEEDED FOR ANXIETY TABLET, FILM COATED ORAL
Qty: 90 | Refills: 0 | Status: ON HOLD | COMMUNITY
Start: 2018-05-01

## 2023-10-18 RX ORDER — TIZANIDINE 4 MG/1
TAKE 1 TABLET BY MOUTH 3 TIMES A DAY AS NEEDED TABLET ORAL
Qty: 90 | Refills: 0 | Status: ON HOLD | COMMUNITY
Start: 2017-10-31

## 2023-10-18 RX ORDER — ZOLMITRIPTAN 5 MG/1
TAKE 1 TABLET AT ONSET OF MIGRAINE. MAY REPEAT ONCE AFTER 2 HOURS. MAX 10 MG/DAY TABLET, FILM COATED ORAL
Refills: 0 | Status: ON HOLD | COMMUNITY
Start: 2018-08-08

## 2023-10-18 RX ORDER — ERENUMAB-AOOE 70 MG/ML
INJECT MG ONCE INJECTION SUBCUTANEOUS
Refills: 0 | Status: ON HOLD | COMMUNITY
Start: 2018-10-12

## 2023-10-18 RX ORDER — PROMETHAZINE HYDROCHLORIDE 12.5 MG/1
TAKE 1 TABLET BY MOUTH 3 TIMES A DAY AS NEEDED TABLET ORAL
Qty: 90 | Refills: 0 | Status: ON HOLD | COMMUNITY
Start: 2018-11-12

## 2023-10-18 RX ORDER — ALPRAZOLAM 1 MG/1
TAKE 1 TABLET  PRN TABLET ORAL
Refills: 0 | Status: ON HOLD | COMMUNITY
Start: 2018-02-19

## 2023-10-18 RX ORDER — SUMATRIPTAN SUCCINATE 50 MG/1
TAKE 1 TABLET AT ONSET OF MIGRAINE HEADACHE.  MAY REPEAT IN 2 HOURS IF NEEDED TABLET, FILM COATED ORAL
Refills: 0 | Status: ON HOLD | COMMUNITY
Start: 2018-09-06

## 2023-10-18 RX ORDER — LEVOTHYROXINE SODIUM 0.07 MG/1
TAKE 1 TABLET DAILY TABLET ORAL
Refills: 0 | Status: ON HOLD | COMMUNITY
Start: 2018-08-15

## 2023-10-18 RX ORDER — METHYLPREDNISOLONE 4 MG/1
TAKE 6 TABLETS ON DAY 1 AS DIRECTED ON PACKAGE AND DECREASE BY 1 TAB E TABLET ORAL
Qty: 21 | Refills: 0 | Status: ON HOLD | COMMUNITY
Start: 2018-04-05

## 2023-10-18 RX ORDER — ESCITALOPRAM 20 MG/1
TAKE 1 TABLET BY MOUTH EVERY MORNING TABLET, FILM COATED ORAL
Qty: 30 | Refills: 0 | Status: ON HOLD | COMMUNITY
Start: 2018-10-22

## 2023-10-18 RX ORDER — INDOMETHACIN 50 MG/1
TAKE 1 CAPSULE BY MOUTH TWICE A DAY WITH FOOD OR MILK CAPSULE ORAL
Qty: 60 | Refills: 0 | Status: ON HOLD | COMMUNITY
Start: 2019-03-29

## 2023-10-18 RX ORDER — ESTRADIOL 0.1 MG/D
APPLY 1 PATCH TWICE WEEKLY AS DIRECTED FILM, EXTENDED RELEASE TRANSDERMAL
Refills: 0 | Status: ACTIVE | COMMUNITY
Start: 2018-08-20

## 2023-10-18 RX ORDER — SULFAMETHOXAZOLE AND TRIMETHOPRIM 800; 160 MG/1; MG/1
TABLET ORAL
Qty: 20 | Refills: 0 | Status: ON HOLD | COMMUNITY
Start: 2018-04-05

## 2023-10-19 ENCOUNTER — TELEPHONE ENCOUNTER (OUTPATIENT)
Dept: URBAN - METROPOLITAN AREA CLINIC 113 | Facility: CLINIC | Age: 58
End: 2023-10-19

## 2024-01-04 ENCOUNTER — OFFICE VISIT (OUTPATIENT)
Dept: URBAN - METROPOLITAN AREA CLINIC 113 | Facility: CLINIC | Age: 59
End: 2024-01-04
Payer: COMMERCIAL

## 2024-01-04 VITALS
BODY MASS INDEX: 38.99 KG/M2 | TEMPERATURE: 96.7 F | RESPIRATION RATE: 16 BRPM | DIASTOLIC BLOOD PRESSURE: 93 MMHG | HEART RATE: 65 BPM | HEIGHT: 65 IN | WEIGHT: 234 LBS | SYSTOLIC BLOOD PRESSURE: 129 MMHG

## 2024-01-04 DIAGNOSIS — K31.A0 INTESTINAL METAPLASIA OF GASTRIC MUCOSA: ICD-10-CM

## 2024-01-04 DIAGNOSIS — R12 HEARTBURN: ICD-10-CM

## 2024-01-04 DIAGNOSIS — D50.0 IRON DEFICIENCY ANEMIA DUE TO CHRONIC BLOOD LOSS: ICD-10-CM

## 2024-01-04 DIAGNOSIS — K59.04 CHRONIC IDIOPATHIC CONSTIPATION: ICD-10-CM

## 2024-01-04 PROCEDURE — 99214 OFFICE O/P EST MOD 30 MIN: CPT | Performed by: NURSE PRACTITIONER

## 2024-01-04 RX ORDER — PROMETHAZINE HYDROCHLORIDE 25 MG/1
TAKE 1 TABLET EVERY 6 HOURS AS NEEDED FOR NAUSEA TABLET ORAL
Refills: 0 | Status: ON HOLD | COMMUNITY
Start: 2018-10-12

## 2024-01-04 RX ORDER — PROMETHAZINE HYDROCHLORIDE 12.5 MG/1
TAKE 1 TABLET BY MOUTH 3 TIMES A DAY AS NEEDED TABLET ORAL
Qty: 90 | Refills: 0 | Status: ON HOLD | COMMUNITY
Start: 2018-11-12

## 2024-01-04 RX ORDER — ZOLMITRIPTAN 5 MG/1
TAKE 1 TABLET AT ONSET OF MIGRAINE. MAY REPEAT ONCE AFTER 2 HOURS. MAX 10 MG/DAY TABLET, FILM COATED ORAL
Refills: 0 | Status: ON HOLD | COMMUNITY
Start: 2018-08-08

## 2024-01-04 RX ORDER — VENLAFAXINE HYDROCHLORIDE 75 MG/1
TAKE ONE CAPSULE BY MOUTH EVERY DAY AT SAME TIME AS 150MG CAPSULE, EXTENDED RELEASE ORAL
Qty: 30 | Refills: 0 | Status: ON HOLD | COMMUNITY
Start: 2018-11-12

## 2024-01-04 RX ORDER — METFORMIN HYDROCHLORIDE 850 MG/1
1 TABLET WITH A MEAL TABLET, FILM COATED ORAL ONCE A DAY
Status: ACTIVE | COMMUNITY

## 2024-01-04 RX ORDER — SUMATRIPTAN SUCCINATE 50 MG/1
TAKE 1 TABLET AT ONSET OF MIGRAINE HEADACHE.  MAY REPEAT IN 2 HOURS IF NEEDED TABLET, FILM COATED ORAL
Refills: 0 | Status: ON HOLD | COMMUNITY
Start: 2018-09-06

## 2024-01-04 RX ORDER — CLONIDINE HYDROCHLORIDE 0.1 MG/1
TAKE 1 TABLET BY MOUTH AT BEDTIME TABLET ORAL
Qty: 30 | Refills: 0 | Status: ON HOLD | COMMUNITY
Start: 2018-08-01

## 2024-01-04 RX ORDER — UBROGEPANT 100 MG/1
1 TABLET MAY TAKE SECOND DOSE AT LEAST 2 HOURS AFTER FIRST DOSE AS NEEDED TABLET ORAL ONCE A DAY
Status: ACTIVE | COMMUNITY

## 2024-01-04 RX ORDER — PANTOPRAZOLE SODIUM 40 MG/1
1 TABLET TABLET, DELAYED RELEASE ORAL ONCE A DAY
Qty: 30 | Refills: 5 | OUTPATIENT
Start: 2024-01-04

## 2024-01-04 RX ORDER — TRAZODONE HYDROCHLORIDE 300 MG/1
1 TABLET AT BEDTIME AS NEEDED TABLET ORAL ONCE A DAY
Refills: 0 | Status: ACTIVE | COMMUNITY
Start: 2018-02-19

## 2024-01-04 RX ORDER — PRAZOSIN HYDROCHLORIDE 1 MG/1
TAKE 1 TO 2 CAPSULES BY MOUTH AT BEDTIME CAPSULE ORAL
Qty: 60 | Refills: 0 | Status: ON HOLD | COMMUNITY
Start: 2017-11-27

## 2024-01-04 RX ORDER — VENLAFAXINE HYDROCHLORIDE 150 MG/1
CAPSULE, EXTENDED RELEASE ORAL
Qty: 30 | Refills: 0 | Status: ON HOLD | COMMUNITY
Start: 2018-11-12

## 2024-01-04 RX ORDER — SULFAMETHOXAZOLE AND TRIMETHOPRIM 800; 160 MG/1; MG/1
TABLET ORAL
Qty: 20 | Refills: 0 | Status: ON HOLD | COMMUNITY
Start: 2018-04-05

## 2024-01-04 RX ORDER — INDOMETHACIN 50 MG/1
TAKE 1 CAPSULE BY MOUTH TWICE A DAY WITH FOOD OR MILK CAPSULE ORAL
Qty: 60 | Refills: 0 | Status: ON HOLD | COMMUNITY
Start: 2019-03-29

## 2024-01-04 RX ORDER — ALBUTEROL SULFATE 90 UG/1
INHALE 1 TO 2 PUFFS EVERY 4 TO 6 HOURS AS NEEDED AEROSOL, METERED RESPIRATORY (INHALATION)
Refills: 0 | Status: ON HOLD | COMMUNITY
Start: 2019-01-02

## 2024-01-04 RX ORDER — CODEINE PHOSPHATE AND GUAIFENESIN 10; 100 MG/5ML; MG/5ML
TAKE 5 ML BY MOUTH EVERY 4 HOURS FOR 10 DAYS SOLUTION ORAL
Qty: 300 | Refills: 0 | Status: ON HOLD | COMMUNITY
Start: 2018-11-12

## 2024-01-04 RX ORDER — LIOTHYRONINE SODIUM 5 UG/1
TAKE 1 TABLET DAILY TABLET ORAL
Refills: 0 | Status: ON HOLD | COMMUNITY
Start: 2018-12-04

## 2024-01-04 RX ORDER — ESTRADIOL 0.1 MG/D
APPLY 1 PATCH TWICE WEEKLY AS DIRECTED FILM, EXTENDED RELEASE TRANSDERMAL
Refills: 0 | Status: ACTIVE | COMMUNITY
Start: 2018-08-20

## 2024-01-04 RX ORDER — ALPRAZOLAM 1 MG/1
TAKE 1 TABLET  PRN TABLET ORAL
Refills: 0 | Status: ON HOLD | COMMUNITY
Start: 2018-02-19

## 2024-01-04 RX ORDER — ESCITALOPRAM 20 MG/1
TAKE 1 TABLET BY MOUTH EVERY MORNING TABLET, FILM COATED ORAL
Qty: 30 | Refills: 0 | Status: ON HOLD | COMMUNITY
Start: 2018-10-22

## 2024-01-04 RX ORDER — DOXYCYCLINE HYCLATE 100 MG/1
TABLET ORAL
Qty: 20 | Refills: 0 | Status: ON HOLD | COMMUNITY
Start: 2018-05-12

## 2024-01-04 RX ORDER — FLUOXETINE 20 MG/1
TAKE 3 CAPSULES BY MOUTH EVERY DAY CAPSULE ORAL
Qty: 90 | Refills: 0 | Status: ON HOLD | COMMUNITY
Start: 2018-08-01

## 2024-01-04 RX ORDER — LEVOTHYROXINE SODIUM 0.07 MG/1
TAKE 1 TABLET DAILY TABLET ORAL
Refills: 0 | Status: ON HOLD | COMMUNITY
Start: 2018-08-15

## 2024-01-04 RX ORDER — HYDROXYZINE HYDROCHLORIDE 25 MG/1
TAKE 1 TABLET BY MOUTH AS NEEDED 1 HOUR PRIOR TO BEDTIME TABLET, FILM COATED ORAL
Qty: 30 | Refills: 0 | Status: ON HOLD | COMMUNITY
Start: 2019-01-02

## 2024-01-04 RX ORDER — FLUOXETINE HYDROCHLORIDE 40 MG/1
TAKE ONE CAPSULE BY MOUTH EVERY DAY CAPSULE ORAL
Qty: 30 | Refills: 0 | Status: ON HOLD | COMMUNITY
Start: 2018-02-19

## 2024-01-04 RX ORDER — TOPIRAMATE 100 MG/1
TABLET, FILM COATED ORAL
Qty: 180 | Refills: 0 | Status: ON HOLD | COMMUNITY
Start: 2018-09-13

## 2024-01-04 RX ORDER — METHYLPREDNISOLONE 4 MG/1
TAKE 6 TABLETS ON DAY 1 AS DIRECTED ON PACKAGE AND DECREASE BY 1 TAB E TABLET ORAL
Qty: 21 | Refills: 0 | Status: ON HOLD | COMMUNITY
Start: 2018-04-05

## 2024-01-04 RX ORDER — ERENUMAB-AOOE 70 MG/ML
INJECT MG ONCE INJECTION SUBCUTANEOUS
Refills: 0 | Status: ON HOLD | COMMUNITY
Start: 2018-10-12

## 2024-01-04 RX ORDER — OXYCODONE AND ACETAMINOPHEN 7.5; 325 MG/1; MG/1
TABLET ORAL
Qty: 20 | Refills: 0 | Status: ON HOLD | COMMUNITY
Start: 2018-05-17

## 2024-01-04 RX ORDER — PROPRANOLOL HYDROCHLORIDE 80 MG/1
TAKE 1 CAPSULE DAILY CAPSULE, EXTENDED RELEASE ORAL
Refills: 0 | Status: ACTIVE | COMMUNITY
Start: 2018-08-15

## 2024-01-04 RX ORDER — ALBUTEROL SULFATE 90 UG/1
INHALE 1-2 PUFFS INTO THE LUNGS 4 TIMES DAILY AS NEEDED AEROSOL, METERED RESPIRATORY (INHALATION)
Qty: 9 | Refills: 0 | Status: ON HOLD | COMMUNITY
Start: 2018-05-12

## 2024-01-04 RX ORDER — AZELASTINE HCL 205.5 UG/1
INSTILL 1 SPRAY IN EACH NOSTRIL TWICE DAILY AS NEEDED FOR CONGESTION SPRAY NASAL
Qty: 30 | Refills: 0 | Status: ON HOLD | COMMUNITY
Start: 2018-04-05

## 2024-01-04 RX ORDER — OXYCODONE AND ACETAMINOPHEN 5; 325 MG/1; MG/1
TAKE 1 OR 2 TABLETS BY MOUTH EVERY 4 HOURS AS NEEDED TABLET ORAL
Qty: 40 | Refills: 0 | Status: ON HOLD | COMMUNITY
Start: 2018-06-02

## 2024-01-04 RX ORDER — HYDROCODONE BITARTRATE AND ACETAMINOPHEN 5; 325 MG/1; MG/1
TAKE 1 TABLET BY MOUTH EVERY 6 HOURS TABLET ORAL
Qty: 40 | Refills: 0 | Status: ON HOLD | COMMUNITY
Start: 2018-06-14

## 2024-01-04 RX ORDER — TRAZODONE HYDROCHLORIDE 150 MG/1
TAKE 1 TABLET ONCE DAILY TABLET ORAL
Refills: 0 | Status: ON HOLD | COMMUNITY
Start: 2018-08-28

## 2024-01-04 RX ORDER — TIZANIDINE 4 MG/1
TAKE 1 TABLET BY MOUTH 3 TIMES A DAY AS NEEDED TABLET ORAL
Qty: 90 | Refills: 0 | Status: ON HOLD | COMMUNITY
Start: 2017-10-31

## 2024-01-04 RX ORDER — BUSPIRONE HYDROCHLORIDE 10 MG/1
TAKE 1 TABLET BY MOUTH TWICE A DAY TABLET ORAL
Qty: 60 | Refills: 0 | Status: ON HOLD | COMMUNITY
Start: 2018-08-01

## 2024-01-04 RX ORDER — GALCANEZUMAB 120 MG/ML
INJECT 2 PENS SUBCUTANEOUSLY AS DIRECTED FOR LOADING DOSE INJECTION, SOLUTION SUBCUTANEOUS
Qty: 2 | Refills: 0 | Status: ON HOLD | COMMUNITY
Start: 2019-02-21

## 2024-01-04 RX ORDER — ATORVASTATIN CALCIUM 80 MG/1
1 TABLET TABLET, FILM COATED ORAL ONCE A DAY
Status: ACTIVE | COMMUNITY

## 2024-01-04 RX ORDER — PROMETHAZINE HYDROCHLORIDE AND DEXTROMETHORPHAN HYDROBROMIDE 6.25; 15 MG/5ML; MG/5ML
TAKE 5 ML (ORAL) EVERY 6 HOURS FOR 4 DAYS SOLUTION ORAL
Qty: 80 | Refills: 0 | Status: ON HOLD | COMMUNITY
Start: 2018-04-05

## 2024-01-04 RX ORDER — HYDROXYZINE HYDROCHLORIDE 50 MG/1
TAKE 1 TABLET BY MOUTH 3 TIMES A DAY AS NEEDED FOR ANXIETY TABLET, FILM COATED ORAL
Qty: 90 | Refills: 0 | Status: ON HOLD | COMMUNITY
Start: 2018-05-01

## 2024-01-04 RX ORDER — PLECANATIDE 3 MG/1
1 TABLET TABLET ORAL ONCE A DAY
Qty: 30 | Refills: 11 | OUTPATIENT
Start: 2024-01-04 | End: 2024-12-29

## 2024-01-04 RX ORDER — LEVOFLOXACIN 750 MG/1
TAKE 1 TABLET BY MOUTH EVERY DAY FOR 10 DAYS TABLET, FILM COATED ORAL
Qty: 10 | Refills: 0 | Status: ON HOLD | COMMUNITY
Start: 2019-01-02

## 2024-01-04 NOTE — HPI-TODAY'S VISIT:
This is a a 58-year-old female with a history of hypertension, hyperlipidemia, hypothyroidism, iron deficiency anemia, and constipation predominant change in bowel habits presenting for follow-up.She was last seen in the office 10/2/2023.  Chronic constipation was suboptimally managed on Linzess 145 mcg.  She reported occasional loose or watery bowel movements.  Linzess 72 mcg was ineffective.  She was compliant with daily fiber.  A trial of Trulance was recommended.  She was given samples and was to call for prescription if this was effective.  Recent EGD showed chronic gastritis with focal intestinal metaplasia and endocrine cell hyperplasia suggestive of autoimmune gastritis.  She reported a normal B12 level.  This was discussed with Dr. Domingo who recommended an EGD in 1 year to survey intestinal metaplasia.  She had also undergone colonoscopy.  EGD and colonoscopy were negative for source of iron deficiency anemia.  Imaging capsule was scheduled.  Colon cancer screening recommended in 2033.  She is currently taking Linzess with Benefiber.  She is having to strain for bowel movements.  She tried samples of Trulance and reports that it was effective.  She is requesting a prescription. Prior to Christmas, she began to have heartburn.  She denies burning in her chest but reports burning in her back at or just over the level of her shoulder blades.  This is occurring in the afternoon after lunch or supper.  Occasionally, she wakes at night or wakes in the morning with this symptom.  She denies regurgitation, dysphagia, or abdominal pain.  Burning is occurring 2 or 3 times a week consistently.  She has a history of NSAID use.  She was taking Aleve frequently.  However, she has decreased to once a week or less over the past months.  She has nausea in the morning or in the evenings possibly associated with migraines and sinus infection for the last few weeks.  She reports 3 episodes of vomiting.  She denies hematemesis.  Imaging capsule 10/18/2023 normal.  It was reported that the capsule remained in the small bowel at the conclusion of the study.  If she had not passed the capsule by 10/25, she was to undergo a KUB to rule out retention. KUB negative for retention.

## 2024-01-04 NOTE — HPI-OTHER HISTORIES
Labs 12/15/2023: BMP normal with exception of BUN 20.  LFTs normal TB 0.9, ALP 80, ALT 33, AST 23.  CBC: WBC 7.3, hemoglobin 15.2, MCV 96, platelet 276.  Iron 103, TIBC 377, iron saturation 27%.  Ferritin 35.  Lipid panel normal.  Thyroid panel normal.  Vitamin B12 523.  Imaging capsule 10/18/2023 normal. It was reported that the capsule remained in the small bowel at the conclusion of the study. If she had not passed the capsule by 10/25, she was to undergo a KUB to rule out retention. KUB negative for retention.

## 2024-01-11 ENCOUNTER — TELEPHONE ENCOUNTER (OUTPATIENT)
Dept: URBAN - METROPOLITAN AREA CLINIC 113 | Facility: CLINIC | Age: 59
End: 2024-01-11

## 2024-05-06 ENCOUNTER — OFFICE VISIT (OUTPATIENT)
Dept: URBAN - METROPOLITAN AREA CLINIC 113 | Facility: CLINIC | Age: 59
End: 2024-05-06
Payer: COMMERCIAL

## 2024-05-06 ENCOUNTER — LAB OUTSIDE AN ENCOUNTER (OUTPATIENT)
Dept: URBAN - METROPOLITAN AREA CLINIC 113 | Facility: CLINIC | Age: 59
End: 2024-05-06

## 2024-05-06 ENCOUNTER — DASHBOARD ENCOUNTERS (OUTPATIENT)
Age: 59
End: 2024-05-06

## 2024-05-06 VITALS — HEIGHT: 65 IN | BODY MASS INDEX: 38.32 KG/M2 | RESPIRATION RATE: 14 BRPM | WEIGHT: 230 LBS | TEMPERATURE: 97.3 F

## 2024-05-06 DIAGNOSIS — K31.A0 INTESTINAL METAPLASIA OF GASTRIC MUCOSA: ICD-10-CM

## 2024-05-06 DIAGNOSIS — K59.04 CHRONIC IDIOPATHIC CONSTIPATION: ICD-10-CM

## 2024-05-06 PROCEDURE — 99213 OFFICE O/P EST LOW 20 MIN: CPT | Performed by: NURSE PRACTITIONER

## 2024-05-06 RX ORDER — PROPRANOLOL HYDROCHLORIDE 80 MG/1
TAKE 1 CAPSULE DAILY CAPSULE, EXTENDED RELEASE ORAL
Refills: 0 | Status: ON HOLD | COMMUNITY
Start: 2018-08-15

## 2024-05-06 RX ORDER — ALBUTEROL SULFATE 90 UG/1
INHALE 1-2 PUFFS INTO THE LUNGS 4 TIMES DAILY AS NEEDED AEROSOL, METERED RESPIRATORY (INHALATION)
Qty: 9 | Refills: 0 | Status: ON HOLD | COMMUNITY
Start: 2018-05-12

## 2024-05-06 RX ORDER — HYDROXYZINE HYDROCHLORIDE 25 MG/1
TAKE 1 TABLET BY MOUTH AS NEEDED 1 HOUR PRIOR TO BEDTIME TABLET, FILM COATED ORAL
Qty: 30 | Refills: 0 | Status: ON HOLD | COMMUNITY
Start: 2019-01-02

## 2024-05-06 RX ORDER — PLECANATIDE 3 MG/1
1 TABLET TABLET ORAL ONCE A DAY
Qty: 30 | Refills: 11 | Status: ON HOLD | COMMUNITY
Start: 2024-01-04 | End: 2024-12-29

## 2024-05-06 RX ORDER — HYDROCODONE BITARTRATE AND ACETAMINOPHEN 5; 325 MG/1; MG/1
TAKE 1 TABLET BY MOUTH EVERY 6 HOURS TABLET ORAL
Qty: 40 | Refills: 0 | Status: ON HOLD | COMMUNITY
Start: 2018-06-14

## 2024-05-06 RX ORDER — ATORVASTATIN CALCIUM 80 MG/1
1 TABLET TABLET, FILM COATED ORAL ONCE A DAY
Status: ACTIVE | COMMUNITY

## 2024-05-06 RX ORDER — ESCITALOPRAM 20 MG/1
TAKE 1 TABLET BY MOUTH EVERY MORNING TABLET, FILM COATED ORAL
Qty: 30 | Refills: 0 | Status: ON HOLD | COMMUNITY
Start: 2018-10-22

## 2024-05-06 RX ORDER — CLONIDINE HYDROCHLORIDE 0.1 MG/1
TAKE 1 TABLET BY MOUTH AT BEDTIME TABLET ORAL
Qty: 30 | Refills: 0 | Status: ON HOLD | COMMUNITY
Start: 2018-08-01

## 2024-05-06 RX ORDER — ERENUMAB-AOOE 70 MG/ML
INJECT MG ONCE INJECTION SUBCUTANEOUS
Refills: 0 | Status: ON HOLD | COMMUNITY
Start: 2018-10-12

## 2024-05-06 RX ORDER — UBROGEPANT 100 MG/1
1 TABLET MAY TAKE SECOND DOSE AT LEAST 2 HOURS AFTER FIRST DOSE AS NEEDED TABLET ORAL ONCE A DAY
Status: ACTIVE | COMMUNITY

## 2024-05-06 RX ORDER — METFORMIN HYDROCHLORIDE 850 MG/1
1 TABLET WITH A MEAL TABLET, FILM COATED ORAL ONCE A DAY
Status: ACTIVE | COMMUNITY

## 2024-05-06 RX ORDER — FLUOXETINE 20 MG/1
TAKE 3 CAPSULES BY MOUTH EVERY DAY CAPSULE ORAL
Qty: 90 | Refills: 0 | Status: ON HOLD | COMMUNITY
Start: 2018-08-01

## 2024-05-06 RX ORDER — BUSPIRONE HYDROCHLORIDE 10 MG/1
TAKE 1 TABLET BY MOUTH TWICE A DAY TABLET ORAL
Qty: 60 | Refills: 0 | Status: ON HOLD | COMMUNITY
Start: 2018-08-01

## 2024-05-06 RX ORDER — INDOMETHACIN 50 MG/1
TAKE 1 CAPSULE BY MOUTH TWICE A DAY WITH FOOD OR MILK CAPSULE ORAL
Qty: 60 | Refills: 0 | Status: ON HOLD | COMMUNITY
Start: 2019-03-29

## 2024-05-06 RX ORDER — ALBUTEROL SULFATE 90 UG/1
INHALE 1 TO 2 PUFFS EVERY 4 TO 6 HOURS AS NEEDED AEROSOL, METERED RESPIRATORY (INHALATION)
Refills: 0 | Status: ON HOLD | COMMUNITY
Start: 2019-01-02

## 2024-05-06 RX ORDER — TIZANIDINE 4 MG/1
TAKE 1 TABLET BY MOUTH 3 TIMES A DAY AS NEEDED TABLET ORAL
Qty: 90 | Refills: 0 | Status: ON HOLD | COMMUNITY
Start: 2017-10-31

## 2024-05-06 RX ORDER — METHYLPREDNISOLONE 4 MG/1
TAKE 6 TABLETS ON DAY 1 AS DIRECTED ON PACKAGE AND DECREASE BY 1 TAB E TABLET ORAL
Qty: 21 | Refills: 0 | Status: ON HOLD | COMMUNITY
Start: 2018-04-05

## 2024-05-06 RX ORDER — AMLODIPINE BESYLATE 5 MG/1
1 TABLET TABLET ORAL ONCE A DAY
Status: ACTIVE | COMMUNITY

## 2024-05-06 RX ORDER — OXYCODONE AND ACETAMINOPHEN 5; 325 MG/1; MG/1
TAKE 1 OR 2 TABLETS BY MOUTH EVERY 4 HOURS AS NEEDED TABLET ORAL
Qty: 40 | Refills: 0 | Status: ON HOLD | COMMUNITY
Start: 2018-06-02

## 2024-05-06 RX ORDER — TRAZODONE HYDROCHLORIDE 150 MG/1
TAKE 1 TABLET ONCE DAILY TABLET ORAL
Refills: 0 | Status: ON HOLD | COMMUNITY
Start: 2018-08-28

## 2024-05-06 RX ORDER — AZELASTINE HCL 205.5 UG/1
INSTILL 1 SPRAY IN EACH NOSTRIL TWICE DAILY AS NEEDED FOR CONGESTION SPRAY NASAL
Qty: 30 | Refills: 0 | Status: ON HOLD | COMMUNITY
Start: 2018-04-05

## 2024-05-06 RX ORDER — PROMETHAZINE HYDROCHLORIDE AND DEXTROMETHORPHAN HYDROBROMIDE 6.25; 15 MG/5ML; MG/5ML
TAKE 5 ML (ORAL) EVERY 6 HOURS FOR 4 DAYS SOLUTION ORAL
Qty: 80 | Refills: 0 | Status: ON HOLD | COMMUNITY
Start: 2018-04-05

## 2024-05-06 RX ORDER — PRAZOSIN HYDROCHLORIDE 1 MG/1
TAKE 1 TO 2 CAPSULES BY MOUTH AT BEDTIME CAPSULE ORAL
Qty: 60 | Refills: 0 | Status: ON HOLD | COMMUNITY
Start: 2017-11-27

## 2024-05-06 RX ORDER — VENLAFAXINE HYDROCHLORIDE 75 MG/1
TAKE ONE CAPSULE BY MOUTH EVERY DAY AT SAME TIME AS 150MG CAPSULE, EXTENDED RELEASE ORAL
Qty: 30 | Refills: 0 | Status: ON HOLD | COMMUNITY
Start: 2018-11-12

## 2024-05-06 RX ORDER — FLUTICASONE PROPIONATE 93 UG/1
2 SPRAYS (1 SPRAY IN EACH NOSTRIL) SPRAY, METERED NASAL TWICE A DAY
Status: ACTIVE | COMMUNITY

## 2024-05-06 RX ORDER — LINACLOTIDE 145 UG/1
1 CAPSULE AT LEAST 30 MINUTES BEFORE THE FIRST MEAL OF THE DAY ON AN EMPTY STOMACH CAPSULE, GELATIN COATED ORAL ONCE A DAY
Status: ACTIVE | COMMUNITY

## 2024-05-06 RX ORDER — PANTOPRAZOLE SODIUM 40 MG/1
TAKE 1 TABLET BY MOUTH ONCE DAILY TABLET, DELAYED RELEASE ORAL
Qty: 90 TABLET | Refills: 3 | Status: ON HOLD | COMMUNITY

## 2024-05-06 RX ORDER — LEVOFLOXACIN 750 MG/1
TAKE 1 TABLET BY MOUTH EVERY DAY FOR 10 DAYS TABLET, FILM COATED ORAL
Qty: 10 | Refills: 0 | Status: ON HOLD | COMMUNITY
Start: 2019-01-02

## 2024-05-06 RX ORDER — HYDROXYZINE HYDROCHLORIDE 50 MG/1
TAKE 1 TABLET BY MOUTH 3 TIMES A DAY AS NEEDED FOR ANXIETY TABLET, FILM COATED ORAL
Qty: 90 | Refills: 0 | Status: ON HOLD | COMMUNITY
Start: 2018-05-01

## 2024-05-06 RX ORDER — TOPIRAMATE 100 MG/1
TABLET, FILM COATED ORAL
Qty: 180 | Refills: 0 | Status: ON HOLD | COMMUNITY
Start: 2018-09-13

## 2024-05-06 RX ORDER — PROMETHAZINE HYDROCHLORIDE 12.5 MG/1
TAKE 1 TABLET BY MOUTH 3 TIMES A DAY AS NEEDED TABLET ORAL
Qty: 90 | Refills: 0 | Status: ON HOLD | COMMUNITY
Start: 2018-11-12

## 2024-05-06 RX ORDER — SULFAMETHOXAZOLE AND TRIMETHOPRIM 800; 160 MG/1; MG/1
TABLET ORAL
Qty: 20 | Refills: 0 | Status: ON HOLD | COMMUNITY
Start: 2018-04-05

## 2024-05-06 RX ORDER — PROMETHAZINE HYDROCHLORIDE 25 MG/1
TAKE 1 TABLET EVERY 6 HOURS AS NEEDED FOR NAUSEA TABLET ORAL
Refills: 0 | Status: ON HOLD | COMMUNITY
Start: 2018-10-12

## 2024-05-06 RX ORDER — DOXYCYCLINE HYCLATE 100 MG/1
TABLET ORAL
Qty: 20 | Refills: 0 | Status: ON HOLD | COMMUNITY
Start: 2018-05-12

## 2024-05-06 RX ORDER — ALPRAZOLAM 1 MG/1
TAKE 1 TABLET  PRN TABLET ORAL
Refills: 0 | Status: ON HOLD | COMMUNITY
Start: 2018-02-19

## 2024-05-06 RX ORDER — ZOLMITRIPTAN 5 MG/1
TAKE 1 TABLET AT ONSET OF MIGRAINE. MAY REPEAT ONCE AFTER 2 HOURS. MAX 10 MG/DAY TABLET, FILM COATED ORAL
Refills: 0 | Status: ON HOLD | COMMUNITY
Start: 2018-08-08

## 2024-05-06 RX ORDER — CODEINE PHOSPHATE AND GUAIFENESIN 10; 100 MG/5ML; MG/5ML
TAKE 5 ML BY MOUTH EVERY 4 HOURS FOR 10 DAYS SOLUTION ORAL
Qty: 300 | Refills: 0 | Status: ON HOLD | COMMUNITY
Start: 2018-11-12

## 2024-05-06 RX ORDER — TRAZODONE HYDROCHLORIDE 300 MG/1
1 TABLET AT BEDTIME AS NEEDED TABLET ORAL ONCE A DAY
Refills: 0 | Status: ACTIVE | COMMUNITY
Start: 2018-02-19

## 2024-05-06 RX ORDER — FLUOXETINE HYDROCHLORIDE 40 MG/1
TAKE ONE CAPSULE BY MOUTH EVERY DAY CAPSULE ORAL
Qty: 30 | Refills: 0 | Status: ON HOLD | COMMUNITY
Start: 2018-02-19

## 2024-05-06 RX ORDER — LEVOTHYROXINE SODIUM 0.07 MG/1
TAKE 1 TABLET DAILY TABLET ORAL
Refills: 0 | Status: ON HOLD | COMMUNITY
Start: 2018-08-15

## 2024-05-06 RX ORDER — SUMATRIPTAN SUCCINATE 50 MG/1
TAKE 1 TABLET AT ONSET OF MIGRAINE HEADACHE.  MAY REPEAT IN 2 HOURS IF NEEDED TABLET, FILM COATED ORAL
Refills: 0 | Status: ON HOLD | COMMUNITY
Start: 2018-09-06

## 2024-05-06 RX ORDER — OXYCODONE AND ACETAMINOPHEN 7.5; 325 MG/1; MG/1
TABLET ORAL
Qty: 20 | Refills: 0 | Status: ON HOLD | COMMUNITY
Start: 2018-05-17

## 2024-05-06 RX ORDER — ESTRADIOL 0.1 MG/D
APPLY 1 PATCH TWICE WEEKLY AS DIRECTED FILM, EXTENDED RELEASE TRANSDERMAL
Refills: 0 | Status: ACTIVE | COMMUNITY
Start: 2018-08-20

## 2024-05-06 RX ORDER — LIOTHYRONINE SODIUM 5 UG/1
TAKE 1 TABLET DAILY TABLET ORAL
Refills: 0 | Status: ON HOLD | COMMUNITY
Start: 2018-12-04

## 2024-05-06 RX ORDER — GALCANEZUMAB 120 MG/ML
INJECT 2 PENS SUBCUTANEOUSLY AS DIRECTED FOR LOADING DOSE INJECTION, SOLUTION SUBCUTANEOUS
Qty: 2 | Refills: 0 | Status: ON HOLD | COMMUNITY
Start: 2019-02-21

## 2024-05-06 RX ORDER — VENLAFAXINE HYDROCHLORIDE 150 MG/1
CAPSULE, EXTENDED RELEASE ORAL
Qty: 30 | Refills: 0 | Status: ON HOLD | COMMUNITY
Start: 2018-11-12

## 2024-07-15 ENCOUNTER — CLAIMS CREATED FROM THE CLAIM WINDOW (OUTPATIENT)
Dept: URBAN - METROPOLITAN AREA CLINIC 4 | Facility: CLINIC | Age: 59
End: 2024-07-15
Payer: COMMERCIAL

## 2024-07-15 ENCOUNTER — CLAIMS CREATED FROM THE CLAIM WINDOW (OUTPATIENT)
Dept: URBAN - METROPOLITAN AREA SURGERY CENTER 25 | Facility: SURGERY CENTER | Age: 59
End: 2024-07-15
Payer: COMMERCIAL

## 2024-07-15 DIAGNOSIS — K31.A0 GASTRIC INTESTINAL METAPLASIA, UNSPECIFIED: ICD-10-CM

## 2024-07-15 DIAGNOSIS — K31.89 OTHER DISEASES OF STOMACH AND DUODENUM: ICD-10-CM

## 2024-07-15 DIAGNOSIS — K29.70 GASTRITIS, UNSPECIFIED, WITHOUT BLEEDING: ICD-10-CM

## 2024-07-15 PROCEDURE — 43239 EGD BIOPSY SINGLE/MULTIPLE: CPT | Performed by: INTERNAL MEDICINE

## 2024-07-15 PROCEDURE — 88312 SPECIAL STAINS GROUP 1: CPT | Performed by: PATHOLOGY

## 2024-07-15 PROCEDURE — 00731 ANES UPR GI NDSC PX NOS: CPT | Performed by: NURSE ANESTHETIST, CERTIFIED REGISTERED

## 2024-07-15 PROCEDURE — 43239 EGD BIOPSY SINGLE/MULTIPLE: CPT | Performed by: CLINIC/CENTER

## 2024-07-15 PROCEDURE — 00731 ANES UPR GI NDSC PX NOS: CPT | Performed by: ANESTHESIOLOGY

## 2024-07-15 PROCEDURE — 88305 TISSUE EXAM BY PATHOLOGIST: CPT | Performed by: PATHOLOGY

## 2024-07-15 RX ORDER — PLECANATIDE 3 MG/1
1 TABLET TABLET ORAL ONCE A DAY
Qty: 30 | Refills: 11 | Status: ON HOLD | COMMUNITY
Start: 2024-01-04 | End: 2024-12-29

## 2024-07-15 RX ORDER — INDOMETHACIN 50 MG/1
TAKE 1 CAPSULE BY MOUTH TWICE A DAY WITH FOOD OR MILK CAPSULE ORAL
Qty: 60 | Refills: 0 | Status: ON HOLD | COMMUNITY
Start: 2019-03-29

## 2024-07-15 RX ORDER — SULFAMETHOXAZOLE AND TRIMETHOPRIM 800; 160 MG/1; MG/1
TABLET ORAL
Qty: 20 | Refills: 0 | Status: ON HOLD | COMMUNITY
Start: 2018-04-05

## 2024-07-15 RX ORDER — TOPIRAMATE 100 MG/1
TABLET, FILM COATED ORAL
Qty: 180 | Refills: 0 | Status: ON HOLD | COMMUNITY
Start: 2018-09-13

## 2024-07-15 RX ORDER — FLUOXETINE 20 MG/1
TAKE 3 CAPSULES BY MOUTH EVERY DAY CAPSULE ORAL
Qty: 90 | Refills: 0 | Status: ON HOLD | COMMUNITY
Start: 2018-08-01

## 2024-07-15 RX ORDER — ERENUMAB-AOOE 70 MG/ML
INJECT MG ONCE INJECTION SUBCUTANEOUS
Refills: 0 | Status: ON HOLD | COMMUNITY
Start: 2018-10-12

## 2024-07-15 RX ORDER — LEVOFLOXACIN 750 MG/1
TAKE 1 TABLET BY MOUTH EVERY DAY FOR 10 DAYS TABLET, FILM COATED ORAL
Qty: 10 | Refills: 0 | Status: ON HOLD | COMMUNITY
Start: 2019-01-02

## 2024-07-15 RX ORDER — PROPRANOLOL HYDROCHLORIDE 80 MG/1
TAKE 1 CAPSULE DAILY CAPSULE, EXTENDED RELEASE ORAL
Refills: 0 | Status: ON HOLD | COMMUNITY
Start: 2018-08-15

## 2024-07-15 RX ORDER — HYDROXYZINE HYDROCHLORIDE 50 MG/1
TAKE 1 TABLET BY MOUTH 3 TIMES A DAY AS NEEDED FOR ANXIETY TABLET, FILM COATED ORAL
Qty: 90 | Refills: 0 | Status: ON HOLD | COMMUNITY
Start: 2018-05-01

## 2024-07-15 RX ORDER — UBROGEPANT 100 MG/1
1 TABLET MAY TAKE SECOND DOSE AT LEAST 2 HOURS AFTER FIRST DOSE AS NEEDED TABLET ORAL ONCE A DAY
Status: ACTIVE | COMMUNITY

## 2024-07-15 RX ORDER — ESCITALOPRAM 20 MG/1
TAKE 1 TABLET BY MOUTH EVERY MORNING TABLET, FILM COATED ORAL
Qty: 30 | Refills: 0 | Status: ON HOLD | COMMUNITY
Start: 2018-10-22

## 2024-07-15 RX ORDER — OXYCODONE AND ACETAMINOPHEN 7.5; 325 MG/1; MG/1
TABLET ORAL
Qty: 20 | Refills: 0 | Status: ON HOLD | COMMUNITY
Start: 2018-05-17

## 2024-07-15 RX ORDER — ATORVASTATIN CALCIUM 80 MG/1
1 TABLET TABLET, FILM COATED ORAL ONCE A DAY
Status: ACTIVE | COMMUNITY

## 2024-07-15 RX ORDER — BUSPIRONE HYDROCHLORIDE 10 MG/1
TAKE 1 TABLET BY MOUTH TWICE A DAY TABLET ORAL
Qty: 60 | Refills: 0 | Status: ON HOLD | COMMUNITY
Start: 2018-08-01

## 2024-07-15 RX ORDER — ALPRAZOLAM 1 MG/1
TAKE 1 TABLET  PRN TABLET ORAL
Refills: 0 | Status: ON HOLD | COMMUNITY
Start: 2018-02-19

## 2024-07-15 RX ORDER — LEVOTHYROXINE SODIUM 0.07 MG/1
TAKE 1 TABLET DAILY TABLET ORAL
Refills: 0 | Status: ON HOLD | COMMUNITY
Start: 2018-08-15

## 2024-07-15 RX ORDER — VENLAFAXINE HYDROCHLORIDE 75 MG/1
TAKE ONE CAPSULE BY MOUTH EVERY DAY AT SAME TIME AS 150MG CAPSULE, EXTENDED RELEASE ORAL
Qty: 30 | Refills: 0 | Status: ON HOLD | COMMUNITY
Start: 2018-11-12

## 2024-07-15 RX ORDER — OXYCODONE AND ACETAMINOPHEN 5; 325 MG/1; MG/1
TAKE 1 OR 2 TABLETS BY MOUTH EVERY 4 HOURS AS NEEDED TABLET ORAL
Qty: 40 | Refills: 0 | Status: ON HOLD | COMMUNITY
Start: 2018-06-02

## 2024-07-15 RX ORDER — FLUTICASONE PROPIONATE 93 UG/1
2 SPRAYS (1 SPRAY IN EACH NOSTRIL) SPRAY, METERED NASAL TWICE A DAY
Status: ACTIVE | COMMUNITY

## 2024-07-15 RX ORDER — PROMETHAZINE HYDROCHLORIDE AND DEXTROMETHORPHAN HYDROBROMIDE 6.25; 15 MG/5ML; MG/5ML
TAKE 5 ML (ORAL) EVERY 6 HOURS FOR 4 DAYS SOLUTION ORAL
Qty: 80 | Refills: 0 | Status: ON HOLD | COMMUNITY
Start: 2018-04-05

## 2024-07-15 RX ORDER — VENLAFAXINE HYDROCHLORIDE 150 MG/1
CAPSULE, EXTENDED RELEASE ORAL
Qty: 30 | Refills: 0 | Status: ON HOLD | COMMUNITY
Start: 2018-11-12

## 2024-07-15 RX ORDER — ALBUTEROL SULFATE 90 UG/1
INHALE 1 TO 2 PUFFS EVERY 4 TO 6 HOURS AS NEEDED AEROSOL, METERED RESPIRATORY (INHALATION)
Refills: 0 | Status: ON HOLD | COMMUNITY
Start: 2019-01-02

## 2024-07-15 RX ORDER — PROMETHAZINE HYDROCHLORIDE 12.5 MG/1
TAKE 1 TABLET BY MOUTH 3 TIMES A DAY AS NEEDED TABLET ORAL
Qty: 90 | Refills: 0 | Status: ON HOLD | COMMUNITY
Start: 2018-11-12

## 2024-07-15 RX ORDER — METHYLPREDNISOLONE 4 MG/1
TAKE 6 TABLETS ON DAY 1 AS DIRECTED ON PACKAGE AND DECREASE BY 1 TAB E TABLET ORAL
Qty: 21 | Refills: 0 | Status: ON HOLD | COMMUNITY
Start: 2018-04-05

## 2024-07-15 RX ORDER — LINACLOTIDE 145 UG/1
1 CAPSULE AT LEAST 30 MINUTES BEFORE THE FIRST MEAL OF THE DAY ON AN EMPTY STOMACH CAPSULE, GELATIN COATED ORAL ONCE A DAY
Status: ACTIVE | COMMUNITY

## 2024-07-15 RX ORDER — PROMETHAZINE HYDROCHLORIDE 25 MG/1
TAKE 1 TABLET EVERY 6 HOURS AS NEEDED FOR NAUSEA TABLET ORAL
Refills: 0 | Status: ON HOLD | COMMUNITY
Start: 2018-10-12

## 2024-07-15 RX ORDER — TIZANIDINE 4 MG/1
TAKE 1 TABLET BY MOUTH 3 TIMES A DAY AS NEEDED TABLET ORAL
Qty: 90 | Refills: 0 | Status: ON HOLD | COMMUNITY
Start: 2017-10-31

## 2024-07-15 RX ORDER — PANTOPRAZOLE SODIUM 40 MG/1
TAKE 1 TABLET BY MOUTH ONCE DAILY TABLET, DELAYED RELEASE ORAL
Qty: 90 TABLET | Refills: 3 | Status: ON HOLD | COMMUNITY

## 2024-07-15 RX ORDER — ALBUTEROL SULFATE 90 UG/1
INHALE 1-2 PUFFS INTO THE LUNGS 4 TIMES DAILY AS NEEDED AEROSOL, METERED RESPIRATORY (INHALATION)
Qty: 9 | Refills: 0 | Status: ON HOLD | COMMUNITY
Start: 2018-05-12

## 2024-07-15 RX ORDER — DOXYCYCLINE HYCLATE 100 MG/1
TABLET ORAL
Qty: 20 | Refills: 0 | Status: ON HOLD | COMMUNITY
Start: 2018-05-12

## 2024-07-15 RX ORDER — ZOLMITRIPTAN 5 MG/1
TAKE 1 TABLET AT ONSET OF MIGRAINE. MAY REPEAT ONCE AFTER 2 HOURS. MAX 10 MG/DAY TABLET, FILM COATED ORAL
Refills: 0 | Status: ON HOLD | COMMUNITY
Start: 2018-08-08

## 2024-07-15 RX ORDER — ESTRADIOL 0.1 MG/D
APPLY 1 PATCH TWICE WEEKLY AS DIRECTED FILM, EXTENDED RELEASE TRANSDERMAL
Refills: 0 | Status: ACTIVE | COMMUNITY
Start: 2018-08-20

## 2024-07-15 RX ORDER — SUMATRIPTAN SUCCINATE 50 MG/1
TAKE 1 TABLET AT ONSET OF MIGRAINE HEADACHE.  MAY REPEAT IN 2 HOURS IF NEEDED TABLET, FILM COATED ORAL
Refills: 0 | Status: ON HOLD | COMMUNITY
Start: 2018-09-06

## 2024-07-15 RX ORDER — CODEINE PHOSPHATE AND GUAIFENESIN 10; 100 MG/5ML; MG/5ML
TAKE 5 ML BY MOUTH EVERY 4 HOURS FOR 10 DAYS SOLUTION ORAL
Qty: 300 | Refills: 0 | Status: ON HOLD | COMMUNITY
Start: 2018-11-12

## 2024-07-15 RX ORDER — GALCANEZUMAB 120 MG/ML
INJECT 2 PENS SUBCUTANEOUSLY AS DIRECTED FOR LOADING DOSE INJECTION, SOLUTION SUBCUTANEOUS
Qty: 2 | Refills: 0 | Status: ON HOLD | COMMUNITY
Start: 2019-02-21

## 2024-07-15 RX ORDER — LIOTHYRONINE SODIUM 5 UG/1
TAKE 1 TABLET DAILY TABLET ORAL
Refills: 0 | Status: ON HOLD | COMMUNITY
Start: 2018-12-04

## 2024-07-15 RX ORDER — AZELASTINE HYDROCHLORIDE 205.5 UG/1
INSTILL 1 SPRAY IN EACH NOSTRIL TWICE DAILY AS NEEDED FOR CONGESTION SPRAY, METERED NASAL
Qty: 30 | Refills: 0 | Status: ON HOLD | COMMUNITY
Start: 2018-04-05

## 2024-07-15 RX ORDER — HYDROCODONE BITARTRATE AND ACETAMINOPHEN 5; 325 MG/1; MG/1
TAKE 1 TABLET BY MOUTH EVERY 6 HOURS TABLET ORAL
Qty: 40 | Refills: 0 | Status: ON HOLD | COMMUNITY
Start: 2018-06-14

## 2024-07-15 RX ORDER — AMLODIPINE BESYLATE 5 MG/1
1 TABLET TABLET ORAL ONCE A DAY
Status: ACTIVE | COMMUNITY

## 2024-07-15 RX ORDER — METFORMIN HYDROCHLORIDE 850 MG/1
1 TABLET WITH A MEAL TABLET, FILM COATED ORAL ONCE A DAY
Status: ACTIVE | COMMUNITY

## 2024-07-15 RX ORDER — TRAZODONE HYDROCHLORIDE 150 MG/1
TAKE 1 TABLET ONCE DAILY TABLET ORAL
Refills: 0 | Status: ON HOLD | COMMUNITY
Start: 2018-08-28

## 2024-07-15 RX ORDER — CLONIDINE HYDROCHLORIDE 0.1 MG/1
TAKE 1 TABLET BY MOUTH AT BEDTIME TABLET ORAL
Qty: 30 | Refills: 0 | Status: ON HOLD | COMMUNITY
Start: 2018-08-01

## 2024-07-15 RX ORDER — FLUOXETINE HYDROCHLORIDE 40 MG/1
TAKE ONE CAPSULE BY MOUTH EVERY DAY CAPSULE ORAL
Qty: 30 | Refills: 0 | Status: ON HOLD | COMMUNITY
Start: 2018-02-19

## 2024-07-15 RX ORDER — PRAZOSIN HYDROCHLORIDE 1 MG/1
TAKE 1 TO 2 CAPSULES BY MOUTH AT BEDTIME CAPSULE ORAL
Qty: 60 | Refills: 0 | Status: ON HOLD | COMMUNITY
Start: 2017-11-27

## 2024-07-15 RX ORDER — HYDROXYZINE HYDROCHLORIDE 25 MG/1
TAKE 1 TABLET BY MOUTH AS NEEDED 1 HOUR PRIOR TO BEDTIME TABLET, FILM COATED ORAL
Qty: 30 | Refills: 0 | Status: ON HOLD | COMMUNITY
Start: 2019-01-02

## 2024-07-15 RX ORDER — TRAZODONE HYDROCHLORIDE 300 MG/1
1 TABLET AT BEDTIME AS NEEDED TABLET ORAL ONCE A DAY
Refills: 0 | Status: ACTIVE | COMMUNITY
Start: 2018-02-19